# Patient Record
Sex: FEMALE | Race: BLACK OR AFRICAN AMERICAN | Employment: OTHER | URBAN - METROPOLITAN AREA
[De-identification: names, ages, dates, MRNs, and addresses within clinical notes are randomized per-mention and may not be internally consistent; named-entity substitution may affect disease eponyms.]

---

## 2020-01-15 ENCOUNTER — TRANSFERRED RECORDS (OUTPATIENT)
Dept: HEALTH INFORMATION MANAGEMENT | Facility: CLINIC | Age: 75
End: 2020-01-15

## 2020-02-03 ENCOUNTER — ALLIED HEALTH/NURSE VISIT (OUTPATIENT)
Dept: UROLOGY | Facility: CLINIC | Age: 75
End: 2020-02-03
Payer: COMMERCIAL

## 2020-02-03 ENCOUNTER — PRE VISIT (OUTPATIENT)
Dept: UROLOGY | Facility: CLINIC | Age: 75
End: 2020-02-03

## 2020-02-03 ENCOUNTER — OFFICE VISIT (OUTPATIENT)
Dept: UROLOGY | Facility: CLINIC | Age: 75
End: 2020-02-03
Payer: COMMERCIAL

## 2020-02-03 VITALS — HEIGHT: 64 IN | WEIGHT: 161 LBS | BODY MASS INDEX: 27.49 KG/M2

## 2020-02-03 DIAGNOSIS — Z72.0 TOBACCO USE: ICD-10-CM

## 2020-02-03 DIAGNOSIS — Z86.73 HISTORY OF STROKE: ICD-10-CM

## 2020-02-03 DIAGNOSIS — N28.89 RIGHT RENAL MASS: Primary | ICD-10-CM

## 2020-02-03 PROBLEM — Z74.09 POOR MOBILITY: Status: ACTIVE | Noted: 2019-11-02

## 2020-02-03 PROBLEM — R10.9 ABDOMINAL PAIN: Status: ACTIVE | Noted: 2020-01-16

## 2020-02-03 PROBLEM — L40.9 PSORIASIS: Status: ACTIVE | Noted: 2019-11-02

## 2020-02-03 PROBLEM — M17.9 DJD (DEGENERATIVE JOINT DISEASE) OF KNEE: Status: ACTIVE | Noted: 2020-02-03

## 2020-02-03 PROBLEM — K86.89 DILATION OF PANCREATIC DUCT: Status: ACTIVE | Noted: 2020-01-20

## 2020-02-03 PROBLEM — E78.00 HIGH CHOLESTEROL: Status: ACTIVE | Noted: 2019-11-02

## 2020-02-03 PROBLEM — I10 HYPERTENSION: Status: ACTIVE | Noted: 2019-11-02

## 2020-02-03 PROBLEM — E78.5 HYPERLIPIDEMIA: Status: ACTIVE | Noted: 2020-02-03

## 2020-02-03 PROBLEM — I63.9 CVA (CEREBRAL VASCULAR ACCIDENT) (H): Status: ACTIVE | Noted: 2020-02-03

## 2020-02-03 RX ORDER — CEFAZOLIN SODIUM 1 G/50ML
1 INJECTION, SOLUTION INTRAVENOUS SEE ADMIN INSTRUCTIONS
Status: CANCELLED | OUTPATIENT
Start: 2020-02-03

## 2020-02-03 RX ORDER — CEFAZOLIN SODIUM 2 G/50ML
2 SOLUTION INTRAVENOUS
Status: CANCELLED | OUTPATIENT
Start: 2020-02-03

## 2020-02-03 RX ORDER — ACETAMINOPHEN 500 MG
1000 TABLET ORAL EVERY 6 HOURS PRN
COMMUNITY
Start: 2019-12-11

## 2020-02-03 RX ORDER — FLUOCINOLONE ACETONIDE 0.11 MG/ML
OIL TOPICAL PRN
COMMUNITY
Start: 2019-07-17

## 2020-02-03 RX ORDER — BENZONATATE 100 MG/1
100 CAPSULE ORAL PRN
COMMUNITY
Start: 2019-09-08

## 2020-02-03 RX ORDER — AMOXICILLIN 250 MG
2 CAPSULE ORAL DAILY PRN
COMMUNITY
Start: 2020-01-20

## 2020-02-03 RX ORDER — KETOCONAZOLE 20 MG/G
CREAM TOPICAL DAILY PRN
COMMUNITY
Start: 2019-07-17

## 2020-02-03 RX ORDER — POTASSIUM CHLORIDE 750 MG/1
10 TABLET, EXTENDED RELEASE ORAL EVERY MORNING
COMMUNITY

## 2020-02-03 RX ORDER — AMLODIPINE BESYLATE 10 MG/1
10 TABLET ORAL EVERY MORNING
COMMUNITY
Start: 2019-07-08

## 2020-02-03 RX ORDER — ASCORBIC ACID 500 MG
500 TABLET ORAL EVERY MORNING
COMMUNITY

## 2020-02-03 RX ORDER — ASPIRIN AND DIPYRIDAMOLE 25; 200 MG/1; MG/1
1 CAPSULE, EXTENDED RELEASE ORAL DAILY PRN
COMMUNITY
Start: 2019-07-09

## 2020-02-03 RX ORDER — TRIAMCINOLONE ACETONIDE 1 MG/G
CREAM TOPICAL DAILY PRN
COMMUNITY

## 2020-02-03 RX ORDER — HYDROXYZINE HYDROCHLORIDE 10 MG/1
10 TABLET, FILM COATED ORAL EVERY MORNING
COMMUNITY
Start: 2019-05-13

## 2020-02-03 RX ORDER — CALCIPOTRIENE 0.05 MG/ML
1 SOLUTION TOPICAL DAILY PRN
COMMUNITY
Start: 2019-05-13

## 2020-02-03 RX ORDER — DIPHENHYD/PHENYLEPH/ACETAMINOP 12.5-5-325
TABLET ORAL
COMMUNITY
Start: 2019-09-04

## 2020-02-03 RX ORDER — DIPHENHYDRAMINE HCL 25 MG
25 CAPSULE ORAL DAILY PRN
COMMUNITY
Start: 2019-05-21

## 2020-02-03 RX ORDER — HYDROCHLOROTHIAZIDE 25 MG/1
25 TABLET ORAL EVERY MORNING
COMMUNITY
Start: 2019-11-21

## 2020-02-03 RX ORDER — MEMANTINE HYDROCHLORIDE 5 MG/1
5 TABLET ORAL EVERY MORNING
Status: ON HOLD | COMMUNITY
End: 2020-02-19

## 2020-02-03 RX ORDER — ATORVASTATIN CALCIUM 80 MG/1
80 TABLET, FILM COATED ORAL EVERY MORNING
COMMUNITY
Start: 2019-07-08

## 2020-02-03 RX ORDER — DIPHENHYDRAMINE HYDROCHLORIDE, ZINC ACETATE 2; .1 G/100G; G/100G
CREAM TOPICAL DAILY PRN
COMMUNITY
Start: 2019-07-08

## 2020-02-03 ASSESSMENT — MIFFLIN-ST. JEOR: SCORE: 1220.29

## 2020-02-03 ASSESSMENT — PAIN SCALES - GENERAL: PAINLEVEL: NO PAIN (0)

## 2020-02-03 NOTE — PROGRESS NOTES
Chief Complaint:   Right Renal Mass         Consult or Referral:     Mr. Zenobia Garcia is a 75 year old female seen at the request of Dr. Thomas.         History of Present Illness:    Zenobia Garcia is a very pleasant 75 year old female who presents with a history of right renal mass. This was noted on workup for abdominal pain. Mass is 9 cm and in right kidney.    ##RENAL MASS INITIAL ENCOUNTER##     Zenobia Garcia is a very pleasant 75 year old female who presents with a history of a Solid renal lesion/mass.  This was discovered due to adominal pain.  Initially diagnosed about 1 month(s) ago.  The lesion is solid and enhancing.  The maximal dimension of the renal lesion is 9 centimeters and located on the right side.      Concerning  her renal function, her last SCr is No results found for: CR.  she has the following risk factors for development of chronic kidney disease stroke, HTN.       she does have a history of previous abdominal or retroperitoneal surgery.  There is not a family history of renal cell cancer.  The patient has a history of smoking and currently is smoking.    The mass/lesion is located in the Right side and is primarily located in the inter polar region.  The tumor is also 50 % endophytic and exophytic.  The mass/lesion primarily lies on the lateral surface.  With regard to the collecting system, the edge of the tumor arrives either abuts the collecting system or arrives within 4 mm of the collecting system.    ECOG Performance Score: 2 - Ambulatory and independent in all ADLs; cannot work; up > 50% of the time         Past Medical History:   CVA - right-sided defects  Remote history of brain tumor  HLD  HTN  Psoriasis         Past Surgical History:   Cholecystectomy  Previous c-spine surgery         Medications     Current Outpatient Medications   Medication     acetaminophen (TYLENOL) 500 MG tablet     amLODIPine (NORVASC) 10 MG tablet     aspirin-dipyridamole ER (AGGRENOX)   MG 12 hr capsule     atorvastatin (LIPITOR) 80 MG tablet     benzonatate (TESSALON) 100 MG capsule     Blood Pressure Monitoring (BLOOD PRESSURE KIT) KIT     calcipotriene (DOVONOX) 0.005 % external solution     Calcium Carb-Cholecalciferol (OYSTER SHELL CALCIUM/VITAMIN D) 500-200 MG-UNIT TABS     carbamide peroxide (DEBROX) 6.5 % otic solution     Coal Tar Extract 5 % LOTN     diphenhydrAMINE (BANOPHEN) 25 MG capsule     diphenhydrAMINE-zinc acetate (BENADRYL) 2-0.1 % external cream     fluocinolone acetonide (DERMA SMOOTHE/FS BODY) 0.01 % external oil     hydrochlorothiazide (HYDRODIURIL) 25 MG tablet     hydrOXYzine (ATARAX) 10 MG tablet     ketoconazole (NIZORAL) 2 % external cream     memantine (NAMENDA) 5 MG tablet     potassium chloride ER (K-TAB/KLOR-CON) 10 MEQ CR tablet     Salicylic Acid 3 % CREA     senna-docusate (SENOKOT-S/PERICOLACE) 8.6-50 MG tablet     triamcinolone (KENALOG) 0.1 % external cream     vitamin C (ASCORBIC ACID) 500 MG tablet     vitamin D3 (D3-50) 1.25 MG (48997 UT) capsule     No current facility-administered medications for this visit.           Family History:   No known family history of  malignancy. Adopted         Social History:     Social History     Socioeconomic History     Marital status:      Spouse name: Not on file     Number of children: Not on file     Years of education: Not on file     Highest education level: Not on file   Occupational History     Not on file   Social Needs     Financial resource strain: Not on file     Food insecurity:     Worry: Not on file     Inability: Not on file     Transportation needs:     Medical: Not on file     Non-medical: Not on file   Tobacco Use     Smoking status: Current Every Day Smoker     Smokeless tobacco: Never Used   Substance and Sexual Activity     Alcohol use: Not on file     Drug use: Not on file     Sexual activity: Not on file   Lifestyle     Physical activity:     Days per week: Not on file     Minutes per  "session: Not on file     Stress: Not on file   Relationships     Social connections:     Talks on phone: Not on file     Gets together: Not on file     Attends Confucianist service: Not on file     Active member of club or organization: Not on file     Attends meetings of clubs or organizations: Not on file     Relationship status: Not on file     Intimate partner violence:     Fear of current or ex partner: Not on file     Emotionally abused: Not on file     Physically abused: Not on file     Forced sexual activity: Not on file   Other Topics Concern     Not on file   Social History Narrative     Not on file     Current every day smoker         Allergies:   Patient has no known allergies.         Review of Systems:  From intake questionnaire     Skin: negative  Eyes: negative  Ears/Nose/Throat: negative  Respiratory: No shortness of breath, dyspnea on exertion, cough, or hemoptysis  Cardiovascular: No chest pain or palpitations  Gastrointestinal: negative; no nausea/vomiting, constipation or diarrhea  Genitourinary: as per HPI  Musculoskeletal: negative  Neurologic: negative  Psychiatric: negative  Hematologic/Lymphatic/Immunologic: negative  Endocrine: negative         Physical Exam:     Patient is a 75 year old  female   Vitals: Height 1.626 m (5' 4\"), weight 73 kg (161 lb).  Constitutional: Body mass index is 27.64 kg/m .  Alert, no acute distress, oriented, conversant  Eyes: no scleral icterus; extraocular muscles intact, moist conjunctivae  Neck: trachea midline  Ears/nose/mouth: throat/mouth:normal, good dentition  Respiratory: no respiratory distress, or pursed lip breathing  Cardiovascular: pulses strong and intact; no obvious jugular venous distension present  Gastrointestinal: soft, nontender, no organomegaly or masses,   Lymphatics: No inguinal adenopathy  Musculoskeletal: extremities normal, no peripheral edema  Skin: no suspicious lesions or rashes  Neuro: Alert, oriented, speech and mentation normal; " mild dysarthria from stroke  Psych: affect and mood normal, alert and oriented to person, place and time  Gait: Right-sided deficits post-CVA; in wheelchair today      Imaging:    I directly visualized and reviewed all applicable imaging a with patient.    CT CAP 1/15/19:  Impression:   1. Large right renal mass measuring up to 9.0 cm which is highly   consistent with renal cell carcinoma until proven otherwise. Perirenal   soft tissue density, lymph node versus right adrenal nodule. Disease   involvement cannot be excluded.  2. Intrahepatic and extrahepatic biliary ductal dilation which is   nonspecific in the setting of prior cholecystectomy. However, there is   also mild dilation of the main pancreatic duct. Recommend correlation with   outside films for stability. If this is new, would recommend nonemergent   follow-up MRCP.  3. 3.2 cm heterogeneously dense splenic lesion. Recommend correlation with   outside films as this is indeterminate. Hemangioma is a consideration  4. Mild colonic diverticulosis without CT evidence for acute   Diverticulitis    CT Chest 1/18/20:  IMPRESSION:  1. Bilateral pulmonary nodules, for example a 4 mm part solid nodule in   the anterior right upper lobe and a 5 mm pleural-based nodule in the left   lower lobe. Additional nodules as above. Attention on follow-up.  2. Large right thyroid nodule that extends inferiorly through the thoracic   inlet displacing the esophagus to the left, consider further   characterization with ultrasound.  3. Old right rib fractures. No radiographic evidence of osseous metastatic   disease in the chest.  4. Splenic and right renal masses, better characterized on CT abdomen and   pelvis 1/15/2020.      Outside and Past Medical records:    I spent 10 minutes reviewing outside and past medical records.         Assessment and Plan:     Assessment: 75 year old female with 9 cm right renal mass. We had an extensive discussion about the significance of a  localized, enhancing kidney mass.  We discussed that approximately 80% of enhancing renal masses turn out to be kidney cancer upon removal, while 20% are found to be benign.  Although certain features such as size, gender and tumor characteristics can change these risks.    We discussed the role of renal mass biopsy including the fact that renal mass biopsy is very safe with current techniques (risk of tumor seeding far below 1%).  Though renal mass biopsy is usually quite accurate 90-95% of the time, it can be inaccurate and it can be non-diagnostic.  Furthermore, the majority of patients find they just need to proceed to surgery anyway.     We discussed the options for treatment of a localized kidney mass including active surveillance, thermal ablation, and surgical extirpation.  Surgical removal has the best track record and close to a 99% chance of cure for T1a lesions compared to 90% cure with thermal ablation and is generally preferred.  Furthermore, thermal ablation is not optimal for larger masses or centrally located masses.  Active surveillance is also a reasonable option when masses are small and/or life expectancy is less than 5-7 years.    Risks and benefits of laparoscopic radical nephrectomy were discussed in detail. Risks of surgery including bleeding, infection, MI, stroke, DVT/PE, bowel injury and injury to other surrounding structures were discussed. In addition, we discussed potential for positive surgical margins, vascular injury, and potential need for conversion to an open procedure.  All questions were answered.  A written informed consent will be finalized on the morning of the procedure. The anticipated post-operative course was explained.    Patient would like to proceed with nephrectomy. Will plan for MRI to evaluate for tumor thrombus and better assess the splenic lesion. Will also have her see PAC clinic.    Plan:  MRI abdomen  PAC clinic  Schedule for lap radical  nephrectomy    Orders  Orders Placed This Encounter   Procedures     MR Abdomen w/o & w Contrast     CBC with platelets     Basic metabolic panel     PAC Visit Referral (For [facility] Only)     Carlos Morales MD  Urology  [facility]

## 2020-02-03 NOTE — PATIENT INSTRUCTIONS
Please schedule imaging, today if possible.      It was a pleasure meeting with you today.  Thank you for allowing me and my team the privilege of caring for you today.  YOU are the reason we are here, and I truly hope we provided you with the excellent service you deserve.  Please let us know if there is anything else we can do for you so that we can be sure you are leaving completely satisfied with your care experience.

## 2020-02-03 NOTE — TELEPHONE ENCOUNTER
Reason for Visit: Consult    Diagnosis: Renal mass    Orders/Procedures/Records: Records available    Contact Patient: N/A    Rooming Requirements: Normal      Kathy Holt  02/03/20  6:14 AM

## 2020-02-03 NOTE — NURSING NOTE
"Chief Complaint   Patient presents with     Consult     Renal mass       Height 1.626 m (5' 4\"), weight 73 kg (161 lb). Body mass index is 27.64 kg/m .    Patient Active Problem List   Diagnosis     Abdominal pain     CVA (cerebral vascular accident) (H)     Dilation of pancreatic duct     DJD (degenerative joint disease) of knee     High cholesterol     Hyperlipidemia     History of stroke     Hypertension     Poor mobility     Psoriasis     Right renal mass     Tobacco use       No Known Allergies    Current Outpatient Medications   Medication Sig Dispense Refill     acetaminophen (TYLENOL) 500 MG tablet Take 1,000 mg by mouth       amLODIPine (NORVASC) 10 MG tablet Take 10 mg by mouth       aspirin-dipyridamole ER (AGGRENOX)  MG 12 hr capsule Take 1 capsule by mouth       atorvastatin (LIPITOR) 80 MG tablet Take 80 mg by mouth       benzonatate (TESSALON) 100 MG capsule TAKE 1 CAPSULE BY MOUTH THREE TIMES A DAY AS NEEDED FOR COUGH       Blood Pressure Monitoring (BLOOD PRESSURE KIT) KIT        calcipotriene (DOVONOX) 0.005 % external solution        Calcium Carb-Cholecalciferol (OYSTER SHELL CALCIUM/VITAMIN D) 500-200 MG-UNIT TABS Take 1 tablet by mouth       carbamide peroxide (DEBROX) 6.5 % otic solution Place 5 drops in ear(s)       Coal Tar Extract 5 % LOTN Apply 5 mLs topically       diphenhydrAMINE (BANOPHEN) 25 MG capsule TAKE 1 CAPSULE BY MOUTH EVERY 6 HOURS AS NEEDED FOR ITCHING.       diphenhydrAMINE-zinc acetate (BENADRYL) 2-0.1 % external cream        fluocinolone acetonide (DERMA SMOOTHE/FS BODY) 0.01 % external oil Apply to affected areas at bedtime.       hydrochlorothiazide (HYDRODIURIL) 25 MG tablet Take 25 mg by mouth daily       hydrOXYzine (ATARAX) 10 MG tablet Take 10 mg by mouth       ketoconazole (NIZORAL) 2 % external cream        memantine (NAMENDA) 5 MG tablet Take 5 mg by mouth       potassium chloride ER (K-TAB/KLOR-CON) 10 MEQ CR tablet Take 10 mEq by mouth       Salicylic Acid " 3 % CREA Apply 1 g topically       senna-docusate (SENOKOT-S/PERICOLACE) 8.6-50 MG tablet Take 2 tablets by mouth       triamcinolone (KENALOG) 0.1 % external cream        vitamin C (ASCORBIC ACID) 500 MG tablet Take 500 mg by mouth       vitamin D3 (D3-50) 1.25 MG (97040 UT) capsule TAKE ONE CAPSULE BY MOUTH ONE TIME PER WEEK         Social History     Tobacco Use     Smoking status: Current Every Day Smoker     Smokeless tobacco: Never Used   Substance Use Topics     Alcohol use: None     Drug use: None       Kathy Holt, EMT  2/3/2020  8:53 AM

## 2020-02-03 NOTE — TELEPHONE ENCOUNTER
MEDICAL RECORDS REQUEST   [facility] Clinic  [address] [place] [address]  PHONE: [phone]  Fax: [phone]        FUTURE VISIT INFORMATION                                                   Zenobia Jose : 1945 scheduled for future visit at [facility]    APPOINTMENT INFORMATION:    Date: 2/3/20 8:30AM    Provider:  Carlos Morales MD    Reason for Visit/Diagnosis: Renal mass    REFERRAL INFORMATION:    Referring provider:  Ulises Thomas     Specialty: MD    Referring providers clinic:  [facility]    Clinic contact number:  [phone]    RECORDS REQUESTED FOR VISIT                                                     NOTES  STATUS/DETAILS   OFFICE NOTE from referring provider  yes   OFFICE NOTE from other specialist  yes   DISCHARGE SUMMARY from hospital  yes   DISCHARGE REPORT from the ER  yes   OPERATIVE REPORT  no   MEDICATION LIST  yes   KIDNEY MASS     CT ABD PEVLIS  yes     PRE-VISIT CHECKLIST      Record collection complete Yes- [facility] recs in [facility]   Images in [facility] PACS    Appointment appropriately scheduled           (right time/right provider) Yes   MyChart activation If no, please explain: In process    Questionnaire complete If no, please explain: In process      Completed by: Damaris Pedroza

## 2020-02-03 NOTE — LETTER
2/3/2020     RE: Zenobia Garcia  [address]  [facility] [address]     Dear Colleague,    Thank you for referring your patient, Zenobia Garcia, to the [facility] UROLOGY AND INST FOR PROSTATE AND UROLOGIC CANCERS at [facility]. Please see a copy of my visit note below.          Chief Complaint:   Right Renal Mass         Consult or Referral:     Mr. Zenobia Garcia is a 75 year old female seen at the request of Dr. Thomas.         History of Present Illness:    Zenobia Garcia is a very pleasant 75 year old female who presents with a history of right renal mass. This was noted on workup for abdominal pain. Mass is 9 cm and in right kidney.    ##RENAL MASS INITIAL ENCOUNTER##     Zenobia Garcia is a very pleasant 75 year old female who presents with a history of a Solid renal lesion/mass.  This was discovered due to adominal pain.  Initially diagnosed about 1 month(s) ago.  The lesion is solid and enhancing.  The maximal dimension of the renal lesion is 9 centimeters and located on the right side.      Concerning  her renal function, her last SCr is No results found for: CR.  she has the following risk factors for development of chronic kidney disease stroke, HTN.       she does have a history of previous abdominal or retroperitoneal surgery.  There is not a family history of renal cell cancer.  The patient has a history of smoking and currently is smoking.    The mass/lesion is located in the Right side and is primarily located in the inter polar region.  The tumor is also 50 % endophytic and exophytic.  The mass/lesion primarily lies on the lateral surface.  With regard to the collecting system, the edge of the tumor arrives either abuts the collecting system or arrives within 4 mm of the collecting system.    ECOG Performance Score: 2 - Ambulatory and independent in all ADLs; cannot work; up > 50% of the time         Past Medical History:   CVA - right-sided defects  Remote  history of brain tumor  HLD  HTN  Psoriasis         Past Surgical History:   Cholecystectomy  Previous c-spine surgery         Medications     Current Outpatient Medications   Medication     acetaminophen (TYLENOL) 500 MG tablet     amLODIPine (NORVASC) 10 MG tablet     aspirin-dipyridamole ER (AGGRENOX)  MG 12 hr capsule     atorvastatin (LIPITOR) 80 MG tablet     benzonatate (TESSALON) 100 MG capsule     Blood Pressure Monitoring (BLOOD PRESSURE KIT) KIT     calcipotriene (DOVONOX) 0.005 % external solution     Calcium Carb-Cholecalciferol (OYSTER SHELL CALCIUM/VITAMIN D) 500-200 MG-UNIT TABS     carbamide peroxide (DEBROX) 6.5 % otic solution     Coal Tar Extract 5 % LOTN     diphenhydrAMINE (BANOPHEN) 25 MG capsule     diphenhydrAMINE-zinc acetate (BENADRYL) 2-0.1 % external cream     fluocinolone acetonide (DERMA SMOOTHE/FS BODY) 0.01 % external oil     hydrochlorothiazide (HYDRODIURIL) 25 MG tablet     hydrOXYzine (ATARAX) 10 MG tablet     ketoconazole (NIZORAL) 2 % external cream     memantine (NAMENDA) 5 MG tablet     potassium chloride ER (K-TAB/KLOR-CON) 10 MEQ CR tablet     Salicylic Acid 3 % CREA     senna-docusate (SENOKOT-S/PERICOLACE) 8.6-50 MG tablet     triamcinolone (KENALOG) 0.1 % external cream     vitamin C (ASCORBIC ACID) 500 MG tablet     vitamin D3 (D3-50) 1.25 MG (52650 UT) capsule     No current facility-administered medications for this visit.           Family History:   No known family history of  malignancy. Adopted         Social History:     Social History     Socioeconomic History     Marital status:      Spouse name: Not on file     Number of children: Not on file     Years of education: Not on file     Highest education level: Not on file   Occupational History     Not on file   Social Needs     Financial resource strain: Not on file     Food insecurity:     Worry: Not on file     Inability: Not on file     Transportation needs:     Medical: Not on file      "Non-medical: Not on file   Tobacco Use     Smoking status: Current Every Day Smoker     Smokeless tobacco: Never Used   Substance and Sexual Activity     Alcohol use: Not on file     Drug use: Not on file     Sexual activity: Not on file   Lifestyle     Physical activity:     Days per week: Not on file     Minutes per session: Not on file     Stress: Not on file   Relationships     Social connections:     Talks on phone: Not on file     Gets together: Not on file     Attends Confucianism service: Not on file     Active member of club or organization: Not on file     Attends meetings of clubs or organizations: Not on file     Relationship status: Not on file     Intimate partner violence:     Fear of current or ex partner: Not on file     Emotionally abused: Not on file     Physically abused: Not on file     Forced sexual activity: Not on file   Other Topics Concern     Not on file   Social History Narrative     Not on file     Current every day smoker         Allergies:   Patient has no known allergies.         Review of Systems:  From intake questionnaire     Skin: negative  Eyes: negative  Ears/Nose/Throat: negative  Respiratory: No shortness of breath, dyspnea on exertion, cough, or hemoptysis  Cardiovascular: No chest pain or palpitations  Gastrointestinal: negative; no nausea/vomiting, constipation or diarrhea  Genitourinary: as per HPI  Musculoskeletal: negative  Neurologic: negative  Psychiatric: negative  Hematologic/Lymphatic/Immunologic: negative  Endocrine: negative         Physical Exam:     Patient is a 75 year old  female   Vitals: Height 1.626 m (5' 4\"), weight 73 kg (161 lb).  Constitutional: Body mass index is 27.64 kg/m .  Alert, no acute distress, oriented, conversant  Eyes: no scleral icterus; extraocular muscles intact, moist conjunctivae  Neck: trachea midline  Ears/nose/mouth: throat/mouth:normal, good dentition  Respiratory: no respiratory distress, or pursed lip breathing  Cardiovascular: " pulses strong and intact; no obvious jugular venous distension present  Gastrointestinal: soft, nontender, no organomegaly or masses,   Lymphatics: No inguinal adenopathy  Musculoskeletal: extremities normal, no peripheral edema  Skin: no suspicious lesions or rashes  Neuro: Alert, oriented, speech and mentation normal; mild dysarthria from stroke  Psych: affect and mood normal, alert and oriented to person, place and time  Gait: Right-sided deficits post-CVA; in wheelchair today      Imaging:    I directly visualized and reviewed all applicable imaging a with patient.    CT CAP 1/15/19:  Impression:   1. Large right renal mass measuring up to 9.0 cm which is highly   consistent with renal cell carcinoma until proven otherwise. Perirenal   soft tissue density, lymph node versus right adrenal nodule. Disease   involvement cannot be excluded.  2. Intrahepatic and extrahepatic biliary ductal dilation which is   nonspecific in the setting of prior cholecystectomy. However, there is   also mild dilation of the main pancreatic duct. Recommend correlation with   outside films for stability. If this is new, would recommend nonemergent   follow-up MRCP.  3. 3.2 cm heterogeneously dense splenic lesion. Recommend correlation with   outside films as this is indeterminate. Hemangioma is a consideration  4. Mild colonic diverticulosis without CT evidence for acute   Diverticulitis    CT Chest 1/18/20:  IMPRESSION:  1. Bilateral pulmonary nodules, for example a 4 mm part solid nodule in   the anterior right upper lobe and a 5 mm pleural-based nodule in the left   lower lobe. Additional nodules as above. Attention on follow-up.  2. Large right thyroid nodule that extends inferiorly through the thoracic   inlet displacing the esophagus to the left, consider further   characterization with ultrasound.  3. Old right rib fractures. No radiographic evidence of osseous metastatic   disease in the chest.  4. Splenic and right renal  masses, better characterized on CT abdomen and   pelvis 1/15/2020.      Outside and Past Medical records:    I spent 10 minutes reviewing outside and past medical records.         Assessment and Plan:     Assessment: 75 year old female with 9 cm right renal mass. We had an extensive discussion about the significance of a localized, enhancing kidney mass.  We discussed that approximately 80% of enhancing renal masses turn out to be kidney cancer upon removal, while 20% are found to be benign.  Although certain features such as size, gender and tumor characteristics can change these risks.    We discussed the role of renal mass biopsy including the fact that renal mass biopsy is very safe with current techniques (risk of tumor seeding far below 1%).  Though renal mass biopsy is usually quite accurate 90-95% of the time, it can be inaccurate and it can be non-diagnostic.  Furthermore, the majority of patients find they just need to proceed to surgery anyway.     We discussed the options for treatment of a localized kidney mass including active surveillance, thermal ablation, and surgical extirpation.  Surgical removal has the best track record and close to a 99% chance of cure for T1a lesions compared to 90% cure with thermal ablation and is generally preferred.  Furthermore, thermal ablation is not optimal for larger masses or centrally located masses.  Active surveillance is also a reasonable option when masses are small and/or life expectancy is less than 5-7 years.    Risks and benefits of laparoscopic radical nephrectomy were discussed in detail. Risks of surgery including bleeding, infection, MI, stroke, DVT/PE, bowel injury and injury to other surrounding structures were discussed. In addition, we discussed potential for positive surgical margins, vascular injury, and potential need for conversion to an open procedure.  All questions were answered.  A written informed consent will be finalized on the morning of  the procedure. The anticipated post-operative course was explained.    Patient would like to proceed with nephrectomy. Will plan for MRI to evaluate for tumor thrombus and better assess the splenic lesion. Will also have her see PAC clinic.    Plan:  MRI abdomen  PAC clinic  Schedule for lap radical nephrectomy    Orders  Orders Placed This Encounter   Procedures     MR Abdomen w/o & w Contrast     CBC with platelets     Basic metabolic panel     PAC Visit Referral (For [facility] Only)     Carlos Morales MD  Urology  [facility]

## 2020-02-04 NOTE — TELEPHONE ENCOUNTER
FUTURE VISIT INFORMATION      SURGERY INFORMATION:    urology / surgery pending    Consult: OV 2/3/20 Carmen    RECORDS REQUESTED FROM:       Primary Care Provider: Mamie Denton MD  - [facility]    Pertinent Medical History: Hypertension    Most recent EKG+ Tracin/15/20- [facility]- requested tracing

## 2020-02-06 DIAGNOSIS — N39.0 URINARY TRACT INFECTION: Primary | ICD-10-CM

## 2020-02-06 NOTE — NURSING NOTE
Pre Op Teaching Flowsheet       Pre and Post op Patient Education  Relevant Diagnosis:  Right renal mass  Surgical procedure:  Right nephrectomy, ROBOT-ASSISTED, LAPAROSCOPIC, POSSIBLE OPEN  Teaching Topic:  Pre and post op teaching  Person Involved in teaching: Zenobia Garcia    Motivation Level:  Asks Questions: Yes  Eager to Learn:  Yes  Cooperative: Yes  Receptive (willing/able to accept information):  Yes    Patient demonstrates understanding of the following:  Date of surgery:  2/19/2020  Location of surgery:  [facility] [place] [facility]  History and Physical and any other testing necessary prior to surgery: Yes  Required time line for completion of History and Physical and any pre-op testing: Yes    Patient demonstrates understanding of the following:  Pre-op bowel prep:  N/A  Pre-op showering/scrub information with PCMX Soap: Yes  Blood thinner medications discussed and when to stop (if applicable):  Yes      Infection Prevention:   Patient demonstrates understanding of the following:  Surgical procedure site care taught: Yes  Signs and symptoms of infection taught:  Yes      Post-op follow-up:  Discussed how to contact the hospital, nurse, and clinic scheduling staff if necessary.    Instructional materials used/given/mailed:  [facility] Booklet, post op teaching sheet, Map, Soap, and arrival/location information.    Surgical instructions packet given to patient in office:  Yes.  MRI and PAC to be done 2/7/2020  Patient also advised to decrease or remove salt from her diet. Recommend she see dietitian to help her with finding alternative to making her foods taste better without adding extra salts.  Directions given to the patient for MRI since it will be at the [facility] and the Pac is here at the [facility]  Her sister is here assisting patient.

## 2020-02-07 ENCOUNTER — PRE VISIT (OUTPATIENT)
Dept: SURGERY | Facility: CLINIC | Age: 75
End: 2020-02-07

## 2020-02-07 ENCOUNTER — ANESTHESIA EVENT (OUTPATIENT)
Dept: SURGERY | Facility: CLINIC | Age: 75
DRG: 657 | End: 2020-02-07
Payer: COMMERCIAL

## 2020-02-07 ENCOUNTER — OFFICE VISIT (OUTPATIENT)
Dept: SURGERY | Facility: CLINIC | Age: 75
End: 2020-02-07
Payer: COMMERCIAL

## 2020-02-07 ENCOUNTER — HOSPITAL ENCOUNTER (OUTPATIENT)
Dept: MRI IMAGING | Facility: CLINIC | Age: 75
Discharge: HOME | End: 2020-02-07
Attending: UROLOGY | Admitting: UROLOGY
Payer: COMMERCIAL

## 2020-02-07 VITALS
OXYGEN SATURATION: 100 % | RESPIRATION RATE: 16 BRPM | WEIGHT: 139 LBS | DIASTOLIC BLOOD PRESSURE: 68 MMHG | HEIGHT: 63 IN | BODY MASS INDEX: 24.63 KG/M2 | SYSTOLIC BLOOD PRESSURE: 142 MMHG | HEART RATE: 75 BPM | TEMPERATURE: 98.9 F

## 2020-02-07 DIAGNOSIS — N28.89 RIGHT RENAL MASS: ICD-10-CM

## 2020-02-07 DIAGNOSIS — F17.200 TOBACCO USE DISORDER: ICD-10-CM

## 2020-02-07 DIAGNOSIS — Z01.818 PRE-OP EVALUATION: Primary | ICD-10-CM

## 2020-02-07 DIAGNOSIS — R07.9 INTERMITTENT CHEST PAIN: ICD-10-CM

## 2020-02-07 DIAGNOSIS — N39.0 URINARY TRACT INFECTION: ICD-10-CM

## 2020-02-07 DIAGNOSIS — I15.9 SECONDARY HYPERTENSION: ICD-10-CM

## 2020-02-07 DIAGNOSIS — E78.5 DYSLIPIDEMIA: ICD-10-CM

## 2020-02-07 LAB
ALBUMIN UR-MCNC: >499 MG/DL
ANION GAP SERPL CALCULATED.3IONS-SCNC: 5 MMOL/L (ref 3–14)
APPEARANCE UR: ABNORMAL
BILIRUB UR QL STRIP: NEGATIVE
BUN SERPL-MCNC: 18 MG/DL (ref 7–30)
CALCIUM SERPL-MCNC: 9.5 MG/DL (ref 8.5–10.1)
CHLORIDE SERPL-SCNC: 105 MMOL/L (ref 94–109)
CO2 SERPL-SCNC: 31 MMOL/L (ref 20–32)
COLOR UR AUTO: YELLOW
CREAT SERPL-MCNC: 0.98 MG/DL (ref 0.52–1.04)
ERYTHROCYTE [DISTWIDTH] IN BLOOD BY AUTOMATED COUNT: 15.4 % (ref 10–15)
GFR SERPL CREATININE-BSD FRML MDRD: 57 ML/MIN/{1.73_M2}
GLUCOSE SERPL-MCNC: 160 MG/DL (ref 70–99)
GLUCOSE UR STRIP-MCNC: NEGATIVE MG/DL
HCT VFR BLD AUTO: 38.8 % (ref 35–47)
HGB BLD-MCNC: 12.3 G/DL (ref 11.7–15.7)
HGB UR QL STRIP: NEGATIVE
KETONES UR STRIP-MCNC: NEGATIVE MG/DL
LEUKOCYTE ESTERASE UR QL STRIP: NEGATIVE
MCH RBC QN AUTO: 26.6 PG (ref 26.5–33)
MCHC RBC AUTO-ENTMCNC: 31.7 G/DL (ref 31.5–36.5)
MCV RBC AUTO: 84 FL (ref 78–100)
MUCOUS THREADS #/AREA URNS LPF: PRESENT /LPF
NITRATE UR QL: NEGATIVE
PH UR STRIP: 6 PH (ref 5–7)
PLATELET # BLD AUTO: 337 10E9/L (ref 150–450)
POTASSIUM SERPL-SCNC: 3.5 MMOL/L (ref 3.4–5.3)
RBC # BLD AUTO: 4.62 10E12/L (ref 3.8–5.2)
RBC #/AREA URNS AUTO: 1 /HPF (ref 0–2)
SODIUM SERPL-SCNC: 141 MMOL/L (ref 133–144)
SOURCE: ABNORMAL
SP GR UR STRIP: 1.02 (ref 1–1.03)
SQUAMOUS #/AREA URNS AUTO: 13 /HPF (ref 0–1)
UROBILINOGEN UR STRIP-MCNC: 0 MG/DL (ref 0–2)
WBC # BLD AUTO: 5.1 10E9/L (ref 4–11)
WBC #/AREA URNS AUTO: 2 /HPF (ref 0–5)

## 2020-02-07 PROCEDURE — A9585 GADOBUTROL INJECTION: HCPCS | Performed by: UROLOGY

## 2020-02-07 PROCEDURE — 25500064 ZZH RX 255 OP 636: Performed by: UROLOGY

## 2020-02-07 PROCEDURE — 74183 MRI ABD W/O CNTR FLWD CNTR: CPT

## 2020-02-07 RX ORDER — GADOBUTROL 604.72 MG/ML
7.5 INJECTION INTRAVENOUS ONCE
Status: COMPLETED | OUTPATIENT
Start: 2020-02-07 | End: 2020-02-07

## 2020-02-07 RX ADMIN — GADOBUTROL 7.3 ML: 604.72 INJECTION INTRAVENOUS at 10:54

## 2020-02-07 ASSESSMENT — LIFESTYLE VARIABLES: TOBACCO_USE: 1

## 2020-02-07 ASSESSMENT — MIFFLIN-ST. JEOR: SCORE: 1104.63

## 2020-02-07 ASSESSMENT — PAIN SCALES - GENERAL: PAINLEVEL: NO PAIN (0)

## 2020-02-07 NOTE — ANESTHESIA PREPROCEDURE EVALUATION
Anesthesia Pre-Procedure Evaluation    Patient: Zenobia Garcia   MRN:     [id number] Gender:   female   Age:    75 year old :      1945        Preoperative Diagnosis: Right renal mass [N28.89]   Procedure(s):  NEPHRECTOMY, ROBOT-ASSISTED, LAPAROSCOPIC, POSSIBLE OPEN     Past Medical History:   Diagnosis Date     Current tobacco use      Dyslipidemia      H/O: CVA (cerebrovascular accident)      Hypertension       No past surgical history on file.     No Known Allergies  Social History     Tobacco Use     Smoking status: Current Every Day Smoker     Packs/day: 0.50     Smokeless tobacco: Never Used   Substance Use Topics     Alcohol use: Yes     Comment: once in a while     Prior to Admission medications    Medication Sig Start Date End Date Taking? Authorizing Provider   acetaminophen (TYLENOL) 500 MG tablet Take 1,000 mg by mouth every 6 hours as needed  19  Yes Reported, Patient   amLODIPine (NORVASC) 10 MG tablet Take 10 mg by mouth every morning  19  Yes Reported, Patient   aspirin-dipyridamole ER (AGGRENOX)  MG 12 hr capsule Take 1 capsule by mouth daily as needed  19  Yes Reported, Patient   atorvastatin (LIPITOR) 80 MG tablet Take 80 mg by mouth every morning  19  Yes Reported, Patient   benzonatate (TESSALON) 100 MG capsule Take 100 mg by mouth as needed  19  Yes Reported, Patient   calcipotriene (DOVONOX) 0.005 % external solution Apply 1 applicator topically daily as needed  19  Yes Reported, Patient   Calcium Carb-Cholecalciferol (OYSTER SHELL CALCIUM/VITAMIN D) 500-200 MG-UNIT TABS Take 1 tablet by mouth every morning  19  Yes Reported, Patient   Coal Tar Extract 5 % LOTN Apply 5 mLs topically daily as needed  19  Yes Reported, Patient   diphenhydrAMINE (BANOPHEN) 25 MG capsule Take 25 mg by mouth daily as needed  19  Yes Reported, Patient   diphenhydrAMINE-zinc acetate (BENADRYL) 2-0.1 % external cream Apply topically daily as needed  19  Yes  Reported, Patient   fluocinolone acetonide (DERMA SMOOTHE/FS BODY) 0.01 % external oil as needed  7/17/19  Yes Reported, Patient   hydrochlorothiazide (HYDRODIURIL) 25 MG tablet Take 25 mg by mouth every morning  11/21/19  Yes Reported, Patient   hydrOXYzine (ATARAX) 10 MG tablet Take 10 mg by mouth every morning  5/13/19  Yes Reported, Patient   ketoconazole (NIZORAL) 2 % external cream Apply topically daily as needed  7/17/19  Yes Reported, Patient   memantine XR 28 MG PO 24 hr capsule Take 28 mg by mouth daily   Yes Reported, Patient   potassium chloride ER (K-TAB/KLOR-CON) 10 MEQ CR tablet Take 10 mEq by mouth every morning    Yes Reported, Patient   Salicylic Acid 3 % CREA Apply 1 g topically daily as needed  5/13/19  Yes Reported, Patient   senna-docusate (SENOKOT-S/PERICOLACE) 8.6-50 MG tablet Take 2 tablets by mouth daily as needed for constipation  1/20/20  Yes Reported, Patient   triamcinolone (KENALOG) 0.1 % external cream Apply topically daily as needed    Yes Reported, Patient   vitamin C (ASCORBIC ACID) 500 MG tablet Take 500 mg by mouth every morning    Yes Reported, Patient   vitamin D2 05448 units (1250 mcg) PO capsule Take 50,000 Units by mouth once a week   Yes Reported, Patient   Blood Pressure Monitoring (BLOOD PRESSURE KIT) KIT  9/4/19   Reported, Patient     Current Facility-Administered Medications Ordered in Epic   Medication Dose Route Frequency Last Rate Last Dose     ceFAZolin (ANCEF) 1 g vial to attach to  ml bag for ADULT or 50 ml bag for PEDS  1 g Intravenous See Admin Instructions         ceFAZolin (ANCEF) intermittent infusion 2 g in 100 mL dextrose PRE-MIX  2 g Intravenous Pre-Op/Pre-procedure x 1 dose         lactated ringers infusion   Intravenous Continuous         lidocaine 1 % 0.1-1 mL  0.1-1 mL Other Q1H PRN         No current [facility]-ordered outpatient medications on file.       lactated ringers       Recent Labs   Lab Test 02/19/20  0621 02/07/20  1453   NA  --  141    POTASSIUM 3.3* 3.5   CHLORIDE  --  105   CO2  --  31   ANIONGAP  --  5   GLC  --  160*   BUN  --  18   CR  --  0.98   FARA  --  9.5     Recent Labs   Lab Test 02/07/20  1453   WBC 5.1   HGB 12.3        No results for input(s): ABO, RH in the last 44250 hours.  No results for input(s): TROPI in the last 71559 hours.  No results for input(s): PH, PCO2, PO2, HCO3 in the last 11752 hours.  No results for input(s): HCG in the last 58877 hours.  Recent Results (from the past 744 hour(s))   MR Abdomen w/o & w Contrast    Narrative    EXAMINATION: MR ABDOMEN W/O & W CONTRAST, 2/7/2020 11:30 AM    COMPARISON: CT abdomen pelvis 1/15/2020    HISTORY: right renal mass; splenic lesion. eval for IVC thrombus and  characterize splenic lesion; Right renal mass    TECHNIQUE:     Images were acquired with and without gadolinium contrast through the  abdomen. Multiplanar T2, axial T1 in/out of phase, and diffusion  weighted images were obtained without contrast. Multiplanar  T1-weighted images with fat saturation were acquired at the multiple  time points following the administration of intravenous contrast.  Contrast dose: 7.3ml gadavist    FINDINGS:    Right kidney: Several subcentimeter simple cyst in the left kidney.  Hydronephrosis or hydroureter. No suspicious enhancing lesions.    Left kidney:   There is a mass within the superior segment pole the right kidney  which measures approximately 9.0 x 7.3 x 8.6 cm. No masses  heterogeneously hyperintense on T2 images, and demonstrates mild  heterogeneous enhancement, with central necrosis. There is no clear  invasion of the right renal vein. The mass demonstrates a large degree  of diffusion restriction.    There is a mass arising from the superior pole of the right kidney  which measures approximately 9.0 x 7.3 x 8.6 cm. The mass demonstrates  heterogeneous enhancement and central necrosis. The right renal vein  is patent.    Remainder of abdomen:   Images are degraded by  motion artifact. There is a 2.6 x 3.1 cm round,  T2 heterogeneously hyperintense lesion in the spleen, which  demonstrates heterogeneous enhancement on the portal venous phase  postcontrast images, and fairly homogeneous enhancement on the delayed  images. Of note, the enhancement characteristics of the splenic lesion  are different from the mass occupying the right kidney. There appears  to be subtle diffusion restriction with the central aspect of the  splenic lesion, although less intense compared to the right renal  mass.    No focal liver lesions. No evidence of steatosis and iron deposition.  The gallbladder is surgically absent. There is intrahepatic and extra  hepatic biliary dilatation with prominence of the common bile duct  measuring up to 11 mm. Main pancreatic duct is also prominent measures  4 mm the pancreatic body. No definite obstructing stone or other  lesion is identified. Duodenal diverticulum is seen posterior to the  pancreatic head. The major abdominal vasculature is patent. There are  no abnormally dilated loops of bowel. There are no enlarged lymph  nodes in the upper abdomen.    No suspicious osseous lesions.      Impression    IMPRESSION:   1. There is a 9.0 x 7.3 x 8.6 cm mass in the right kidney, which  demonstrates heterogeneous enhancement and large degree of diffusion  restriction. These findings are suspicious for renal cell carcinoma,  favor chromophobe variant. There is no definite invasion of the right  renal vein.   2. 3.1 cm round enhancing lesion in the spleen, which is favored to  represent a hemangioma.   3. Intrahepatic and extrahepatic biliary dilatation, which could be  related to reservoir effect following cholecystectomy. Mildly  prominent main pancreatic duct measuring up to 4 mm. No obstructing  stone or other lesion is identified.     I have personally reviewed the examination and initial interpretation  and I agree with the findings.    YONATAN YUEN MD   Echo  Stress Echocardiogram    [facility]    [id number]  [id number]  [id number]  [id number]^GLEN^MYA^JHON           [facility],[place]  Echocardiography Laboratory  [address] [place] [id number]     Name: FELICE BAIN  MRN: [id number]  : 1945  Study Date: 02/10/2020 03:22 PM  Age: 75 yrs  Gender: Female  Patient Location: [facility]  Reason For Study: Pre-op evaluation, Tobacco use disorder, Secondary  hypertension,  Ordering Physician: YMA CARROLL  Referring Physician: MYA CARROLL  Performed By: Lucia Esquivel RDCS, KATIE     BSA: 1.7 m2  Height: 63 in  Weight: 139 lb  HR: 69  BP: 143/25 mmHg  _____________________________________________________________________________  __     _____________________________________________________________________________  __        Interpretation Summary  Normal Dobutamine stress echocardiogram at target heart rate.  No resting or stress induced regional wall motion abnormalities.  No symptoms during the test.  Normal baseline and stress ECGs.  Normal BP and heart rate response to Dobutamine.  Normal baseline screening echocardiogram.  No significant valvular abnormalities.  _____________________________________________________________________________  __     Stress  The drug infusion was stopped due to target heart rate achieved.  The patient did not exhibit any symptoms during drug infusion.  The maximum dose of dobutamine was 30mcg/kg/min.  The maximum dose of metoprolol was 2mg.  Definity (NDC #55816-856-89) given intravenously.  Patient was given 6ml mixture of 1.5ml Definity and 8.5ml saline.  4 ml wasted.  The EKG portion of this stress test was negative for inducible ischemia (see  echo results below).  This was a normal stress echocardiogram with no evidence of stress-induced  ischemia.  Normal left ventricular function and wall motion at rest and post-stress.  The visual ejection fraction is estimated at >70%.     Baseline  Normal  baseline electrocardiogram.  Normal left ventricular function and wall motion at rest.  The visual ejection fraction is estimated at 55-60%.     Stress Results                                       Maximum Predicted HR:   147 bpm             Target HR: 125 bpm        % Maximum Predicted HR: 88 %                             Stage  DurationHeart Rate  BP                                 (mm:ss)   (bpm)                         Baseline            69    143/25                           Peak    8:30     130    153/66                            Stress Duration:   8:30 mm:ss *                      Maximum Stress HR: 130 bpm *     Left Ventricle  Normal Dobutamine stress echocardiogram at target heart rate.  No resting or stress induced regional wall motion abnormalities.  No symptoms during the test.  Normal baseline and stress ECGs.  Normal BP and heart rate response to Dobutamine.  Normal baseline screening echocardiogram.  No significant valvular abnormalities.     Vessels  The aortic root is normal size.     Procedure  Dobutamine stress echo with two dimensional color and spectral Doppler  performed. Contrast Definity.     _____________________________________________________________________________  __  MMode/2D Measurements & Calculations  asc Aorta Diam: 3.0 cm           Doppler Measurements & Calculations  TR max dion: 218.4 cm/sec  TR max P.1 mmHg           _____________________________________________________________________________  __           Report approved by: Philip Dawson 02/10/2020 04:44 PM        Recent Results (from the past 4320 hour(s))   Echo Stress Echocardiogram    Narrative    [id number]  [id number]  [id number]  [id number]^GLEN^MYA^JHON           [facility],[place]  Echocardiography Laboratory  [address] [place] [id number]     Name: FELICE BAIN  MRN: [id number]  : 1945  Study Date: 02/10/2020 03:22 PM  Age: 75 yrs  Gender: Female  Patient  Location: [facility]  Reason For Study: Pre-op evaluation, Tobacco use disorder, Secondary  hypertension,  Ordering Physician: MYA CARROLL  Referring Physician: MYA CARROLL  Performed By: Lucia Esquivel [facility]     BSA: 1.7 m2  Height: 63 in  Weight: 139 lb  HR: 69  BP: 143/25 mmHg  _____________________________________________________________________________  __     _____________________________________________________________________________  __        Interpretation Summary  Normal Dobutamine stress echocardiogram at target heart rate.  No resting or stress induced regional wall motion abnormalities.  No symptoms during the test.  Normal baseline and stress ECGs.  Normal BP and heart rate response to Dobutamine.  Normal baseline screening echocardiogram.  No significant valvular abnormalities.  _____________________________________________________________________________  __     Stress  The drug infusion was stopped due to target heart rate achieved.  The patient did not exhibit any symptoms during drug infusion.  The maximum dose of dobutamine was 30mcg/kg/min.  The maximum dose of metoprolol was 2mg.  Definity (NDC #15264-704-06) given intravenously.  Patient was given 6ml mixture of 1.5ml Definity and 8.5ml saline.  4 ml wasted.  The EKG portion of this stress test was negative for inducible ischemia (see  echo results below).  This was a normal stress echocardiogram with no evidence of stress-induced  ischemia.  Normal left ventricular function and wall motion at rest and post-stress.  The visual ejection fraction is estimated at >70%.     Baseline  Normal baseline electrocardiogram.  Normal left ventricular function and wall motion at rest.  The visual ejection fraction is estimated at 55-60%.     Stress Results                                       Maximum Predicted HR:   147 bpm             Target HR: 125 bpm        % Maximum Predicted HR: 88 %                             Stage  DurationHeart Rate   BP                                 (mm:ss)   (bpm)                         Baseline            69    143/25                           Peak    8:30     130    153/66                            Stress Duration:   8:30 mm:ss *                      Maximum Stress HR: 130 bpm *     Left Ventricle  Normal Dobutamine stress echocardiogram at target heart rate.  No resting or stress induced regional wall motion abnormalities.  No symptoms during the test.  Normal baseline and stress ECGs.  Normal BP and heart rate response to Dobutamine.  Normal baseline screening echocardiogram.  No significant valvular abnormalities.     Vessels  The aortic root is normal size.     Procedure  Dobutamine stress echo with two dimensional color and spectral Doppler  performed. Contrast Definity.     _____________________________________________________________________________  __  MMode/2D Measurements & Calculations  asc Aorta Diam: 3.0 cm           Doppler Measurements & Calculations  TR max dion: 218.4 cm/sec  TR max P.1 mmHg           _____________________________________________________________________________  __           Report approved by: Philip Dawson 02/10/2020 04:44 PM            RECENT LABS:       Anesthesia Evaluation     . Pt has had prior anesthetic.     History of anesthetic complications   - PONV        ROS/MED HX    ENT/Pulmonary:     (+)tobacco use, Current use , . .    Neurologic:     (+)CVA date:  with deficits- right side weakness,     Cardiovascular:     (+) Dyslipidemia, hypertension----. Taking blood thinners : . . . :. . Previous cardiac testing date:results:Stress Testdate: results:ECG reviewed date:1/15/20 results:SR with marked sinus arrhythmia date: results:          METS/Exercise Tolerance:  1 - Eating, dressing   Hematologic:  - neg hematologic  ROS      (-) History of Transfusion   Musculoskeletal:   (+)  other musculoskeletal- right side [place] secondary to CVA      GI/Hepatic:     (+) GERD  "Symptomatic,       Renal/Genitourinary:         Endo:  - neg endo ROS       Psychiatric:  - neg psychiatric ROS       Infectious Disease:  - neg infectious disease ROS       Malignancy:   (+)   Current evaluation for right renal mass        Other:                         PHYSICAL EXAM:   Mental Status/Neuro: A/A/O   Airway: Facies: Feasible  Mallampati: III  Mouth/Opening: Full  TM distance: > 6 cm  Neck ROM: Limited   Respiratory: Auscultation: CTAB     Resp. Rate: Normal     Resp. Effort: Normal      CV: Rhythm: Regular  Heart: Normal Sounds  Edema: None  Pulses: Normal   Comments:      Dental: Dentures  Dentures: Upper; Lower; Complete                LABS:  CBC: No results found for: WBC, HGB, HCT, PLT  BMP: No results found for: NA, POTASSIUM, CHLORIDE, CO2, BUN, CR, GLC  COAGS: No results found for: PTT, INR, FIBR  POC: No results found for: BGM, HCG, HCGS  OTHER: No results found for: PH, LACT, A1C, FARA, PHOS, MAG, ALBUMIN, PROTTOTAL, ALT, AST, GGT, ALKPHOS, BILITOTAL, BILIDIRECT, LIPASE, AMYLASE, ROMMEL, TSH, T4, T3, CRP, SED     Preop Vitals    BP Readings from Last 3 Encounters:   No data found for BP    Pulse Readings from Last 3 Encounters:   No data found for Pulse      Resp Readings from Last 3 Encounters:   No data found for Resp    SpO2 Readings from Last 3 Encounters:   No data found for SpO2      Temp Readings from Last 1 Encounters:   No data found for Temp    Ht Readings from Last 1 Encounters:   02/03/20 1.626 m (5' 4\")      Wt Readings from Last 1 Encounters:   02/03/20 73 kg (161 lb)    Estimated body mass index is 27.64 kg/m  as calculated from the following:    Height as of 2/3/20: 1.626 m (5' 4\").    Weight as of 2/3/20: 73 kg (161 lb).     LDA:        Assessment:   ASA SCORE: 3    H&P: History and physical reviewed and following examination; no interval change.   Smoking Status:  Non-Smoker/Unknown   NPO Status: NPO Appropriate     Plan:   Anes. Type:  General      Induction:  IV (Standard) "   Airway: ETT; Oral   Access/Monitoring: PIV   Maintenance: Balanced     Postop Plan:   Postop Pain: Opioids  Postop Sedation/Airway: Not planned  Disposition: Inpatient/Admit     PONV Management:   Adult Risk Factors: Female, Non-Smoker, Postop Opioids   Prevention: Ondansetron, Dexamethasone     CONSENT:   Plan and risks discussed with: Patient          Comments for Plan/Consent:  Background propofol gtt  hydromorphone              PAC Discussion and Assessment    ASA Classification: 3  Case is suitable for:   Anesthetic techniques and relevant risks discussed:   Invasive monitoring and risk discussed:   Types:   Possibility and Risk of blood transfusion discussed:   NPO instructions given:   Additional anesthetic preparation and risks discussed:   Needs early admission to pre-op area:   Other:     PAC Resident/NP Anesthesia Assessment:  Zenobia Garcia is a 73 year old female scheduled for NEPHRECTOMY, ROBOT-ASSISTED, LAPAROSCOPIC, POSSIBLE OPEN on 2/19/20 by Dr. Morales in treatment of right renal mass.  PAC referral for risk assessment and optimization for anesthesia with comorbid conditions of hypertension, dyslipidemia, current tobacco use, h/o CVA:    Pre-operative considerations:  1.  Cardiac:  Functional status- METS 1. H/o hypertension and dyslipidemia. Endorses intermittent chest pain with emotionally stressed, most recently about one week ago.  Denies any CP today. EKG 1/15/20 showed SR with sinus arrhythmia.  Dobutamine stress test ordered- will help her to schedule.   intermediate risk surgery with 6.6% (RCRI #) risk of major adverse cardiac event.   2.  Pulm:  Airway feasible.  ARA risk: low.  Encouraged smoking cessation and educated not to smoke on the day of the procedure.   3.  GI:  History of PONV, anti-emetic intervention recommended.  4.  Neuro: h/o CVA 7 years ago with residual right sided weakness. Currently using Aggrenox 25/200 mg daily- will hold 7 days prior to procedure.   H/o dementia on  "namenda- continue    VTE risk: 0.5% if mass is not malignant, 3% if it is    Patient is optimized and is acceptable candidate for the proposed procedure, pending Stress Test results.    Patient discussed with Dr. Lugo    Addendum 2/10/20:  Stress test completed with following results  \"Interpretation Summary  Normal Dobutamine stress echocardiogram at target heart rate.  No resting or stress induced regional wall motion abnormalities.  No symptoms during the test.  Normal baseline and stress ECGs.  Normal BP and heart rate response to Dobutamine.  Normal baseline screening echocardiogram.  No significant valvular abnormalities.\"    Patient is optimized and is acceptable candidate for the proposed procedure    **For further details of assessment, testing, and physical exam please see H and P completed on same date.      Joanna Sidhu PA-C        Mid-Level Provider/Resident:   Date:   Time:     Attending Anesthesiologist Anesthesia Assessment:  I have reviewed the medical record and discussed the patient with the HENRI.  The patient is scheduled for robotic assisted laparoscopic nephrectomy for renal mass  The patient has several co morbidities:    CV :  Multiple risk factors (HTN, HLD, CVA, Tobacco use) and complains of occasional CP with stress that has not been investigated  Pulmonary:  Ongoing tobacco use  CNS:  Remote brain tumor, CVA 7 years ago    Surgery is time sensitive but unexplained chest pain with multiple risk factors necessitates investigation (alternative therapies are available for renal mass therapy per surgeon note)  Will order dobutamine stress echocardiogram.  If positive for ischemia will need cardiology consult and discussion with surgery on treatment plan.      Reviewed and Signed by PAC Anesthesiologist  Anesthesiologist: Arpit Lugo MD  Date: 2/7/2020  Time:   Pass/Fail:   Disposition:     PAC Pharmacist Assessment:        Pharmacist:   Date:   Time:    Joanna Sidhu PA-C  "

## 2020-02-07 NOTE — H&P
Pre-Operative H & P     CC:  Preoperative exam to assess for increased cardiopulmonary risk while undergoing surgery and anesthesia.    Date of Encounter: 2/7/2020  Primary Care Physician:  No Ref-Primary, Physician  associated diagnosis: right renal mass    HPI  Zenobia Garcia is a 75 year old female who presents for pre-operative H & P in preparation for laparoscopic nephrectomy with Dr. Morales on 2/19/20 at [facility]. Patient is being evaluated for comorbid conditions of hypertension, dyslipidemia, current tobacco use, h/o CVA    Ms. Garcia was found to have a right renal mass during workup for abdominal pain about 1 month ago. She was referred to Dr. Morales for further evaluation.  Above procedure planned.     History is obtained from the patient and chart review.      Past Medical History  Past Medical History:   Diagnosis Date     Current tobacco use      Dyslipidemia      H/O: CVA (cerebrovascular accident)      Hypertension        Past Surgical History  Past Surgical History:   Procedure Laterality Date     APPENDECTOMY       CRANIECTOMY         Hx of Blood transfusions/reactions: denies     Hx of abnormal bleeding or anti-platelet use: daily Aggrenox    Menstrual history: No LMP recorded. Patient has had a hysterectomy.:     Steroid use in the last year: denies    Personal or FH with difficulty with Anesthesia:  Patient has a history of PONV, but has no family history of anesthesia complications.      Prior to Admission Medications  Current Outpatient Medications   Medication Sig Dispense Refill     acetaminophen (TYLENOL) 500 MG tablet Take 1,000 mg by mouth as needed        amLODIPine (NORVASC) 10 MG tablet Take 10 mg by mouth every morning        aspirin-dipyridamole ER (AGGRENOX)  MG 12 hr capsule Take 1 capsule by mouth as needed        atorvastatin (LIPITOR) 80 MG tablet Take 80 mg by mouth every morning        benzonatate (TESSALON) 100 MG capsule Take 100 mg by mouth as needed         Blood Pressure Monitoring (BLOOD PRESSURE KIT) KIT        calcipotriene (DOVONOX) 0.005 % external solution Apply 1 applicator topically as needed        Calcium Carb-Cholecalciferol (OYSTER SHELL CALCIUM/VITAMIN D) 500-200 MG-UNIT TABS Take 1 tablet by mouth every morning        Coal Tar Extract 5 % LOTN Apply 5 mLs topically as needed        diphenhydrAMINE (BANOPHEN) 25 MG capsule Take 25 mg by mouth as needed        diphenhydrAMINE-zinc acetate (BENADRYL) 2-0.1 % external cream Apply topically as needed        fluocinolone acetonide (DERMA SMOOTHE/FS BODY) 0.01 % external oil as needed        hydrochlorothiazide (HYDRODIURIL) 25 MG tablet Take 25 mg by mouth every morning        hydrOXYzine (ATARAX) 10 MG tablet Take 10 mg by mouth every morning        ketoconazole (NIZORAL) 2 % external cream Apply topically as needed        memantine (NAMENDA) 5 MG tablet Take 5 mg by mouth every morning        potassium chloride ER (K-TAB/KLOR-CON) 10 MEQ CR tablet Take 10 mEq by mouth every morning        Salicylic Acid 3 % CREA Apply 1 g topically as needed        senna-docusate (SENOKOT-S/PERICOLACE) 8.6-50 MG tablet Take 2 tablets by mouth as needed        triamcinolone (KENALOG) 0.1 % external cream Apply topically as needed        vitamin C (ASCORBIC ACID) 500 MG tablet Take 500 mg by mouth every morning        vitamin D3 (D3-50) 1.25 MG (73809 UT) capsule Take 50,000 Units by mouth every morning          Allergies  No Known Allergies    Social History  Social History     Socioeconomic History     Marital status:      Spouse name: Not on file     Number of children: Not on file     Years of education: Not on file     Highest education level: Not on file   Occupational History     Not on file   Social Needs     Financial resource strain: Not on file     Food insecurity:     Worry: Not on file     Inability: Not on file     Transportation needs:     Medical: Not on file     Non-medical: Not on file   Tobacco  Use     Smoking status: Current Every Day Smoker     Packs/day: 0.50     Smokeless tobacco: Never Used   Substance and Sexual Activity     Alcohol use: Not Currently     Drug use: Yes     Types: Marijuana     Sexual activity: Not on file   Lifestyle     Physical activity:     Days per week: Not on file     Minutes per session: Not on file     Stress: Not on file   Relationships     Social connections:     Talks on phone: Not on file     Gets together: Not on file     Attends [employer] service: Not on file     Active member of club or organization: Not on file     Attends meetings of clubs or organizations: Not on file     Relationship status: Not on file     Intimate partner violence:     Fear of current or ex partner: Not on file     Emotionally abused: Not on file     Physically abused: Not on file     Forced sexual activity: Not on file   Other Topics Concern     Not on file   Social History Narrative     Not on file       Family History  Family History   Adopted: Yes   Family history unknown: Yes       ROS/MED HX    ENT/Pulmonary:     (+)tobacco use, Current use , . .    Neurologic:     (+)CVA date: 2013 with deficits- right side weakness,     Cardiovascular:     (+) Dyslipidemia, hypertension----. Taking blood thinners : . . . :. . Previous cardiac testing date:results:date: results:ECG reviewed date:1/15/20 results:SR with marked sinus arrhythmia date: results:          METS/Exercise Tolerance:  1 - Eating, dressing   Hematologic:  - neg hematologic  ROS      (-) History of Transfusion   Musculoskeletal:   (+)  other musculoskeletal- right side [place] secondary to CVA      GI/Hepatic:     (+) GERD Symptomatic,       Renal/Genitourinary:         Endo:  - neg endo ROS       Psychiatric:  - neg psychiatric ROS       Infectious Disease:  - neg infectious disease ROS       Malignancy:   (+)   Current evaluation for right renal mass        Other:           The complete review of systems is negative other than  "noted in the HPI or here.   Temp: 98.9  F (37.2  C) Temp src: Oral BP: (!) 142/68 Pulse: 75   Resp: 16 SpO2: 100 %         139 lbs 0 oz  5' 3\"   Body mass index is 24.62 kg/m .       Physical Exam  Constitutional: Awake, alert, cooperative, no apparent distress, and appears stated age. Presents with her sister  Eyes: Pupils equal, round and reactive to light, extra ocular muscles intact, sclera clear, conjunctiva normal.  HENT: Normocephalic, oral pharynx with moist mucus membranes, edentulous with Dentures present. No goiter appreciated.   Respiratory: CTAB  Cardiovascular: Regular rate and rhythm, normal S1 and S2, and no murmur noted.  Carotids +2, no bruits. No edema. Palpable pulses to radial arteries.   GI: Normal bowel sounds  Lymph/Hematologic: No cervical lymphadenopathy and no supraclavicular lymphadenopathy.  Genitourinary:  deferred  Skin: Warm and dry.   Musculoskeletal: Limited ROM of neck. There is no redness, warmth, or swelling of the exposed joints. Gross motor strength is normal.    Neurologic: Awake, alert, oriented to name, place and time. Cranial nerves II-XII are grossly intact.   Neuropsychiatric: Calm, cooperative. Normal affect.     Labs: (personally reviewed)  Component      Latest Ref Rng & Units 2/7/2020   Sodium      133 - 144 mmol/L 141   Potassium      3.4 - 5.3 mmol/L 3.5   Chloride      94 - 109 mmol/L 105   Carbon Dioxide      20 - 32 mmol/L 31   Anion Gap      3 - 14 mmol/L 5   Glucose      70 - 99 mg/dL 160 (H)   Urea Nitrogen      7 - 30 mg/dL 18   Creatinine      0.52 - 1.04 mg/dL 0.98   GFR Estimate      >60 mL/min/1.73:m2 57 (L)   GFR Estimate If Black      >60 mL/min/1.73:m2 66   Calcium      8.5 - 10.1 mg/dL 9.5   WBC      4.0 - 11.0 10e9/L 5.1   RBC Count      3.8 - 5.2 10e12/L 4.62   Hemoglobin      11.7 - 15.7 g/dL 12.3   Hematocrit      35.0 - 47.0 % 38.8   MCV      78 - 100 fl 84   MCH      26.5 - 33.0 pg 26.6   MCHC      31.5 - 36.5 g/dL 31.7   RDW      10.0 - 15.0 % " 15.4 (H)   Platelet Count      150 - 450 10e9/L 337       EKG 1/15/20  Sinus rhythm with marked sinus arrhythmia    CT CAP 1/15/19  Impression:   1. Large right renal mass measuring up to 9.0 cm which is highly   consistent with renal cell carcinoma until proven otherwise. Perirenal   soft tissue density, lymph node versus right adrenal nodule. Disease   involvement cannot be excluded.  2. Intrahepatic and extrahepatic biliary ductal dilation which is   nonspecific in the setting of prior cholecystectomy. However, there is   also mild dilation of the main pancreatic duct. Recommend correlation with   outside films for stability. If this is new, would recommend nonemergent   follow-up MRCP.  3. 3.2 cm heterogeneously dense splenic lesion. Recommend correlation with   outside films as this is indeterminate. Hemangioma is a consideration  4. Mild colonic diverticulosis without CT evidence for acute   Diverticulitis    CT Chest 1/18/20  IMPRESSION:  1. Bilateral pulmonary nodules, for example a 4 mm part solid nodule in   the anterior right upper lobe and a 5 mm pleural-based nodule in the left   lower lobe. Additional nodules as above. Attention on follow-up.  2. Large right thyroid nodule that extends inferiorly through the thoracic   inlet displacing the esophagus to the left, consider further   characterization with ultrasound.  3. Old right rib fractures. No radiographic evidence of osseous metastatic   disease in the chest.  4. Splenic and right renal masses, better characterized on CT abdomen and   pelvis 1/15/2020.    Outside records reviewed from: Care everywhere    ASSESSMENT and PLAN  Zenobia Garcia is a 73 year old female scheduled for NEPHRECTOMY, ROBOT-ASSISTED, LAPAROSCOPIC, POSSIBLE OPEN on 2/19/20 by Dr. Morales in treatment of right renal mass.  PAC referral for risk assessment and optimization for anesthesia with comorbid conditions of hypertension, dyslipidemia, current tobacco use, h/o  "CVA:    Pre-operative considerations:  1.  Cardiac:  Functional status- METS 1. H/o hypertension and dyslipidemia. Endorses intermittent chest pain with emotionally stressed, most recently about one week ago.  Denies any CP today. EKG 1/15/20 showed SR with sinus arrhythmia.  Dobutamine stress test ordered- will help her to schedule.   intermediate risk surgery with 6.6% (RCRI #) risk of major adverse cardiac event.   2.  Pulm:  Airway feasible.  ARA risk: low.  Encouraged smoking cessation and educated not to smoke on the day of the procedure.   3.  GI:  History of PONV, anti-emetic intervention recommended.  4.  Neuro: h/o CVA 7 years ago with residual right sided weakness. Currently using Aggrenox 25/200 mg daily- will hold 7 days prior to procedure.   H/o dementia on namenda- continue    VTE risk: 0.5% if mass is not malignant, 3% if it is    Patient is optimized and is acceptable candidate for the proposed procedure, pending Stress Test results.    Patient discussed with Dr. Lugo    Addendum 2/10/20:  Stress test completed with following results  \"Interpretation Summary  Normal Dobutamine stress echocardiogram at target heart rate.  No resting or stress induced regional wall motion abnormalities.  No symptoms during the test.  Normal baseline and stress ECGs.  Normal BP and heart rate response to Dobutamine.  Normal baseline screening echocardiogram.  No significant valvular abnormalities.\"    Patient is optimized and is acceptable candidate for the proposed procedure      Joanna Sidhu PA-C  Preoperative Assessment Center  [facility]  Clinic and [facility] Center  Phone: [phone]  Fax: [phone]  "

## 2020-02-07 NOTE — PATIENT INSTRUCTIONS
I have confirmed patient has instructions for NPO and location of surgery from [facility].  Patient will take Lipitor, amlodipine, namenda morning of surgery and will hold Aggrenox for 7 days prior to procedure.  Please hold ibuprofen and multivitamins and supplements for 7 days prior to procedure.     We will help you schedule a dobutamine stress ECHO prior to your proceudre

## 2020-02-08 LAB
BACTERIA SPEC CULT: NORMAL
Lab: NORMAL
SPECIMEN SOURCE: NORMAL

## 2020-02-10 ENCOUNTER — HOSPITAL ENCOUNTER (OUTPATIENT)
Dept: CARDIOLOGY | Facility: CLINIC | Age: 75
Discharge: HOME | End: 2020-02-10
Attending: PHYSICIAN ASSISTANT | Admitting: PHYSICIAN ASSISTANT
Payer: COMMERCIAL

## 2020-02-10 PROCEDURE — 93016 CV STRESS TEST SUPVJ ONLY: CPT | Performed by: INTERNAL MEDICINE

## 2020-02-10 PROCEDURE — 93325 DOPPLER ECHO COLOR FLOW MAPG: CPT | Mod: 26 | Performed by: INTERNAL MEDICINE

## 2020-02-10 PROCEDURE — 93321 DOPPLER ECHO F-UP/LMTD STD: CPT | Mod: 26 | Performed by: INTERNAL MEDICINE

## 2020-02-10 PROCEDURE — 93350 STRESS TTE ONLY: CPT | Mod: 26 | Performed by: INTERNAL MEDICINE

## 2020-02-10 PROCEDURE — 25000125 ZZHC RX 250: Performed by: INTERNAL MEDICINE

## 2020-02-10 PROCEDURE — 25000128 H RX IP 250 OP 636: Performed by: INTERNAL MEDICINE

## 2020-02-10 PROCEDURE — 25500064 ZZH RX 255 OP 636: Performed by: INTERNAL MEDICINE

## 2020-02-10 PROCEDURE — 93325 DOPPLER ECHO COLOR FLOW MAPG: CPT | Mod: TC

## 2020-02-10 PROCEDURE — 93018 CV STRESS TEST I&R ONLY: CPT | Performed by: INTERNAL MEDICINE

## 2020-02-10 RX ORDER — ATROPINE SULFATE 0.4 MG/ML
.2-2 AMPUL (ML) INJECTION
Status: DISCONTINUED | OUTPATIENT
Start: 2020-02-10 | End: 2020-02-10 | Stop reason: CLARIF

## 2020-02-10 RX ORDER — DOBUTAMINE HYDROCHLORIDE 200 MG/100ML
10-50 INJECTION INTRAVENOUS CONTINUOUS
Status: ACTIVE | OUTPATIENT
Start: 2020-02-10 | End: 2020-02-10

## 2020-02-10 RX ORDER — SODIUM CHLORIDE 9 MG/ML
INJECTION, SOLUTION INTRAVENOUS CONTINUOUS
Status: ACTIVE | OUTPATIENT
Start: 2020-02-10 | End: 2020-02-10

## 2020-02-10 RX ORDER — METOPROLOL TARTRATE 1 MG/ML
1-20 INJECTION, SOLUTION INTRAVENOUS
Status: ACTIVE | OUTPATIENT
Start: 2020-02-10 | End: 2020-02-10

## 2020-02-10 RX ADMIN — PERFLUTREN 6 ML: 6.52 INJECTION, SUSPENSION INTRAVENOUS at 15:47

## 2020-02-10 RX ADMIN — DOBUTAMINE HYDROCHLORIDE 10 MCG/KG/MIN: 200 INJECTION INTRAVENOUS at 15:33

## 2020-02-10 RX ADMIN — METOPROLOL TARTRATE 2 MG: 5 INJECTION INTRAVENOUS at 15:43

## 2020-02-19 ENCOUNTER — TRANSFERRED RECORDS (OUTPATIENT)
Dept: HEALTH INFORMATION MANAGEMENT | Facility: CLINIC | Age: 75
End: 2020-02-19

## 2020-02-19 ENCOUNTER — ANESTHESIA (OUTPATIENT)
Dept: SURGERY | Facility: CLINIC | Age: 75
DRG: 657 | End: 2020-02-19
Payer: COMMERCIAL

## 2020-02-19 ENCOUNTER — HOSPITAL ENCOUNTER (INPATIENT)
Facility: CLINIC | Age: 75
LOS: 1 days | Discharge: HOME | DRG: 657 | End: 2020-02-20
Attending: UROLOGY | Admitting: UROLOGY
Payer: COMMERCIAL

## 2020-02-19 DIAGNOSIS — N28.89 RIGHT RENAL MASS: Primary | ICD-10-CM

## 2020-02-19 LAB
ABO + RH BLD: NORMAL
ABO + RH BLD: NORMAL
ANION GAP SERPL CALCULATED.3IONS-SCNC: 8 MMOL/L (ref 3–14)
BLD GP AB SCN SERPL QL: NORMAL
BLOOD BANK CMNT PATIENT-IMP: NORMAL
BUN SERPL-MCNC: 18 MG/DL (ref 7–30)
CALCIUM SERPL-MCNC: 8.6 MG/DL (ref 8.5–10.1)
CHLORIDE SERPL-SCNC: 104 MMOL/L (ref 94–109)
CO2 SERPL-SCNC: 27 MMOL/L (ref 20–32)
CREAT SERPL-MCNC: 1.37 MG/DL (ref 0.52–1.04)
GFR SERPL CREATININE-BSD FRML MDRD: 38 ML/MIN/{1.73_M2}
GLUCOSE SERPL-MCNC: 124 MG/DL (ref 70–99)
HGB BLD-MCNC: 11.3 G/DL (ref 11.7–15.7)
POTASSIUM SERPL-SCNC: 3.3 MMOL/L (ref 3.4–5.3)
POTASSIUM SERPL-SCNC: 3.9 MMOL/L (ref 3.4–5.3)
SODIUM SERPL-SCNC: 139 MMOL/L (ref 133–144)
SPECIMEN EXP DATE BLD: NORMAL

## 2020-02-19 PROCEDURE — 71000012 ZZH RECOVERY PHASE 1 LEVEL 1 FIRST HR: Performed by: UROLOGY

## 2020-02-19 PROCEDURE — 71000013 ZZH RECOVERY PHASE 1 LEVEL 1 EA ADDTL HR: Performed by: UROLOGY

## 2020-02-19 PROCEDURE — 88341 IMHCHEM/IMCYTCHM EA ADD ANTB: CPT | Performed by: UROLOGY

## 2020-02-19 PROCEDURE — 36000085 ZZH SURGERY LEVEL 8 1ST 30 MIN: Performed by: UROLOGY

## 2020-02-19 PROCEDURE — 88307 TISSUE EXAM BY PATHOLOGIST: CPT | Mod: 26 | Performed by: UROLOGY

## 2020-02-19 PROCEDURE — 25000128 H RX IP 250 OP 636: Performed by: UROLOGY

## 2020-02-19 PROCEDURE — 86850 RBC ANTIBODY SCREEN: CPT | Performed by: UROLOGY

## 2020-02-19 PROCEDURE — 25000566 ZZH SEVOFLURANE, EA 15 MIN: Performed by: UROLOGY

## 2020-02-19 PROCEDURE — 25000132 ZZH RX MED GY IP 250 OP 250 PS 637: Performed by: STUDENT IN AN ORGANIZED HEALTH CARE EDUCATION/TRAINING PROGRAM

## 2020-02-19 PROCEDURE — 84132 ASSAY OF SERUM POTASSIUM: CPT | Performed by: ANESTHESIOLOGY

## 2020-02-19 PROCEDURE — 36000087 ZZH SURGERY LEVEL 8 EA 15 ADDTL MIN: Performed by: UROLOGY

## 2020-02-19 PROCEDURE — 8E0W4CZ ROBOTIC ASSISTED PROCEDURE OF TRUNK REGION, PERCUTANEOUS ENDOSCOPIC APPROACH: ICD-10-PCS | Performed by: UROLOGY

## 2020-02-19 PROCEDURE — 27210794 ZZH OR GENERAL SUPPLY STERILE: Performed by: UROLOGY

## 2020-02-19 PROCEDURE — 88307 TISSUE EXAM BY PATHOLOGIST: CPT | Performed by: UROLOGY

## 2020-02-19 PROCEDURE — 37000008 ZZH ANESTHESIA TECHNICAL FEE, 1ST 30 MIN: Performed by: UROLOGY

## 2020-02-19 PROCEDURE — 50545 LAPARO RADICAL NEPHRECTOMY: CPT | Mod: 22 | Performed by: UROLOGY

## 2020-02-19 PROCEDURE — 36415 COLL VENOUS BLD VENIPUNCTURE: CPT | Performed by: ANESTHESIOLOGY

## 2020-02-19 PROCEDURE — 0DNW4ZZ RELEASE PERITONEUM, PERCUTANEOUS ENDOSCOPIC APPROACH: ICD-10-PCS | Performed by: UROLOGY

## 2020-02-19 PROCEDURE — 40000169 ZZH STATISTIC PRE-PROCEDURE ASSESSMENT I: Performed by: UROLOGY

## 2020-02-19 PROCEDURE — 80048 BASIC METABOLIC PNL TOTAL CA: CPT | Performed by: UROLOGY

## 2020-02-19 PROCEDURE — 00000159 ZZHCL STATISTIC H-SEND OUTS PREP: Performed by: UROLOGY

## 2020-02-19 PROCEDURE — 86900 BLOOD TYPING SEROLOGIC ABO: CPT | Performed by: UROLOGY

## 2020-02-19 PROCEDURE — 86901 BLOOD TYPING SEROLOGIC RH(D): CPT | Performed by: UROLOGY

## 2020-02-19 PROCEDURE — 25000125 ZZHC RX 250: Performed by: ANESTHESIOLOGY

## 2020-02-19 PROCEDURE — 25000125 ZZHC RX 250: Performed by: NURSE ANESTHETIST, CERTIFIED REGISTERED

## 2020-02-19 PROCEDURE — 37000009 ZZH ANESTHESIA TECHNICAL FEE, EACH ADDTL 15 MIN: Performed by: UROLOGY

## 2020-02-19 PROCEDURE — 25800030 ZZH RX IP 258 OP 636: Performed by: ANESTHESIOLOGY

## 2020-02-19 PROCEDURE — 85018 HEMOGLOBIN: CPT | Performed by: UROLOGY

## 2020-02-19 PROCEDURE — 12000000 ZZH R&B MED SURG/OB

## 2020-02-19 PROCEDURE — 36415 COLL VENOUS BLD VENIPUNCTURE: CPT | Performed by: UROLOGY

## 2020-02-19 PROCEDURE — 88342 IMHCHEM/IMCYTCHM 1ST ANTB: CPT | Performed by: UROLOGY

## 2020-02-19 PROCEDURE — 88341 IMHCHEM/IMCYTCHM EA ADD ANTB: CPT | Mod: 26 | Performed by: UROLOGY

## 2020-02-19 PROCEDURE — S2900 ROBOTIC SURGICAL SYSTEM: HCPCS | Performed by: UROLOGY

## 2020-02-19 PROCEDURE — 25000128 H RX IP 250 OP 636: Performed by: NURSE ANESTHETIST, CERTIFIED REGISTERED

## 2020-02-19 PROCEDURE — 88342 IMHCHEM/IMCYTCHM 1ST ANTB: CPT | Mod: 26 | Performed by: UROLOGY

## 2020-02-19 PROCEDURE — 0TT04ZZ RESECTION OF RIGHT KIDNEY, PERCUTANEOUS ENDOSCOPIC APPROACH: ICD-10-PCS | Performed by: UROLOGY

## 2020-02-19 PROCEDURE — 25800030 ZZH RX IP 258 OP 636: Performed by: NURSE ANESTHETIST, CERTIFIED REGISTERED

## 2020-02-19 RX ORDER — ONDANSETRON 2 MG/ML
4 INJECTION INTRAMUSCULAR; INTRAVENOUS EVERY 6 HOURS PRN
Status: DISCONTINUED | OUTPATIENT
Start: 2020-02-19 | End: 2020-02-20 | Stop reason: HOSPADM

## 2020-02-19 RX ORDER — HYDRALAZINE HYDROCHLORIDE 20 MG/ML
2.5-5 INJECTION INTRAMUSCULAR; INTRAVENOUS EVERY 10 MIN PRN
Status: DISCONTINUED | OUTPATIENT
Start: 2020-02-19 | End: 2020-02-19 | Stop reason: HOSPADM

## 2020-02-19 RX ORDER — ONDANSETRON 4 MG/1
4 TABLET, ORALLY DISINTEGRATING ORAL EVERY 6 HOURS PRN
Status: DISCONTINUED | OUTPATIENT
Start: 2020-02-19 | End: 2020-02-20 | Stop reason: HOSPADM

## 2020-02-19 RX ORDER — SODIUM CHLORIDE, SODIUM LACTATE, POTASSIUM CHLORIDE, CALCIUM CHLORIDE 600; 310; 30; 20 MG/100ML; MG/100ML; MG/100ML; MG/100ML
INJECTION, SOLUTION INTRAVENOUS CONTINUOUS
Status: DISCONTINUED | OUTPATIENT
Start: 2020-02-19 | End: 2020-02-19 | Stop reason: HOSPADM

## 2020-02-19 RX ORDER — SODIUM CHLORIDE 9 MG/ML
INJECTION, SOLUTION INTRAVENOUS CONTINUOUS
Status: DISCONTINUED | OUTPATIENT
Start: 2020-02-19 | End: 2020-02-20

## 2020-02-19 RX ORDER — LIDOCAINE HYDROCHLORIDE 20 MG/ML
INJECTION, SOLUTION INFILTRATION; PERINEURAL PRN
Status: DISCONTINUED | OUTPATIENT
Start: 2020-02-19 | End: 2020-02-19

## 2020-02-19 RX ORDER — AMOXICILLIN 250 MG
2 CAPSULE ORAL 2 TIMES DAILY
Status: DISCONTINUED | OUTPATIENT
Start: 2020-02-19 | End: 2020-02-20 | Stop reason: HOSPADM

## 2020-02-19 RX ORDER — POTASSIUM CHLORIDE 750 MG/1
10 TABLET, EXTENDED RELEASE ORAL EVERY MORNING
Status: DISCONTINUED | OUTPATIENT
Start: 2020-02-20 | End: 2020-02-20 | Stop reason: HOSPADM

## 2020-02-19 RX ORDER — GLYCOPYRROLATE 0.2 MG/ML
INJECTION, SOLUTION INTRAMUSCULAR; INTRAVENOUS PRN
Status: DISCONTINUED | OUTPATIENT
Start: 2020-02-19 | End: 2020-02-19

## 2020-02-19 RX ORDER — BUPIVACAINE HYDROCHLORIDE 2.5 MG/ML
INJECTION, SOLUTION EPIDURAL; INFILTRATION; INTRACAUDAL PRN
Status: DISCONTINUED | OUTPATIENT
Start: 2020-02-19 | End: 2020-02-19 | Stop reason: HOSPADM

## 2020-02-19 RX ORDER — CALCIPOTRIENE 0.05 MG/ML
1 SOLUTION TOPICAL DAILY PRN
Status: DISCONTINUED | OUTPATIENT
Start: 2020-02-19 | End: 2020-02-20 | Stop reason: HOSPADM

## 2020-02-19 RX ORDER — ERGOCALCIFEROL 1.25 MG/1
50000 CAPSULE, LIQUID FILLED ORAL WEEKLY
COMMUNITY

## 2020-02-19 RX ORDER — HYDROCHLOROTHIAZIDE 25 MG/1
25 TABLET ORAL EVERY MORNING
Status: DISCONTINUED | OUTPATIENT
Start: 2020-02-20 | End: 2020-02-20 | Stop reason: HOSPADM

## 2020-02-19 RX ORDER — FENTANYL CITRATE 50 UG/ML
25-50 INJECTION, SOLUTION INTRAMUSCULAR; INTRAVENOUS
Status: DISCONTINUED | OUTPATIENT
Start: 2020-02-19 | End: 2020-02-19 | Stop reason: HOSPADM

## 2020-02-19 RX ORDER — HYDROMORPHONE HYDROCHLORIDE 1 MG/ML
0.2 INJECTION, SOLUTION INTRAMUSCULAR; INTRAVENOUS; SUBCUTANEOUS
Status: DISCONTINUED | OUTPATIENT
Start: 2020-02-19 | End: 2020-02-20 | Stop reason: HOSPADM

## 2020-02-19 RX ORDER — PROPOFOL 10 MG/ML
INJECTION, EMULSION INTRAVENOUS PRN
Status: DISCONTINUED | OUTPATIENT
Start: 2020-02-19 | End: 2020-02-19

## 2020-02-19 RX ORDER — MEMANTINE HYDROCHLORIDE 28 MG/1
28 CAPSULE, EXTENDED RELEASE ORAL DAILY
Status: DISCONTINUED | OUTPATIENT
Start: 2020-02-19 | End: 2020-02-20 | Stop reason: HOSPADM

## 2020-02-19 RX ORDER — CEFAZOLIN SODIUM 1 G/3ML
1 INJECTION, POWDER, FOR SOLUTION INTRAMUSCULAR; INTRAVENOUS SEE ADMIN INSTRUCTIONS
Status: DISCONTINUED | OUTPATIENT
Start: 2020-02-19 | End: 2020-02-19 | Stop reason: HOSPADM

## 2020-02-19 RX ORDER — LABETALOL HYDROCHLORIDE 5 MG/ML
10 INJECTION, SOLUTION INTRAVENOUS
Status: DISCONTINUED | OUTPATIENT
Start: 2020-02-19 | End: 2020-02-19 | Stop reason: HOSPADM

## 2020-02-19 RX ORDER — ACETAMINOPHEN 325 MG/1
650 TABLET ORAL EVERY 4 HOURS
Status: DISCONTINUED | OUTPATIENT
Start: 2020-02-19 | End: 2020-02-20 | Stop reason: HOSPADM

## 2020-02-19 RX ORDER — LIDOCAINE 40 MG/G
CREAM TOPICAL
Status: DISCONTINUED | OUTPATIENT
Start: 2020-02-19 | End: 2020-02-20 | Stop reason: HOSPADM

## 2020-02-19 RX ORDER — CEFAZOLIN SODIUM 2 G/100ML
2 INJECTION, SOLUTION INTRAVENOUS
Status: COMPLETED | OUTPATIENT
Start: 2020-02-19 | End: 2020-02-19

## 2020-02-19 RX ORDER — HYDROMORPHONE HYDROCHLORIDE 1 MG/ML
.3-.5 INJECTION, SOLUTION INTRAMUSCULAR; INTRAVENOUS; SUBCUTANEOUS EVERY 5 MIN PRN
Status: DISCONTINUED | OUTPATIENT
Start: 2020-02-19 | End: 2020-02-19 | Stop reason: HOSPADM

## 2020-02-19 RX ORDER — DEXAMETHASONE SODIUM PHOSPHATE 4 MG/ML
INJECTION, SOLUTION INTRA-ARTICULAR; INTRALESIONAL; INTRAMUSCULAR; INTRAVENOUS; SOFT TISSUE PRN
Status: DISCONTINUED | OUTPATIENT
Start: 2020-02-19 | End: 2020-02-19

## 2020-02-19 RX ORDER — PROPOFOL 10 MG/ML
INJECTION, EMULSION INTRAVENOUS CONTINUOUS PRN
Status: DISCONTINUED | OUTPATIENT
Start: 2020-02-19 | End: 2020-02-19

## 2020-02-19 RX ORDER — ONDANSETRON 2 MG/ML
INJECTION INTRAMUSCULAR; INTRAVENOUS PRN
Status: DISCONTINUED | OUTPATIENT
Start: 2020-02-19 | End: 2020-02-19

## 2020-02-19 RX ORDER — OXYCODONE HYDROCHLORIDE 5 MG/1
5-10 TABLET ORAL EVERY 4 HOURS PRN
Status: DISCONTINUED | OUTPATIENT
Start: 2020-02-19 | End: 2020-02-20 | Stop reason: HOSPADM

## 2020-02-19 RX ORDER — NALOXONE HYDROCHLORIDE 0.4 MG/ML
.1-.4 INJECTION, SOLUTION INTRAMUSCULAR; INTRAVENOUS; SUBCUTANEOUS
Status: DISCONTINUED | OUTPATIENT
Start: 2020-02-19 | End: 2020-02-20 | Stop reason: HOSPADM

## 2020-02-19 RX ORDER — ONDANSETRON 4 MG/1
4 TABLET, ORALLY DISINTEGRATING ORAL EVERY 30 MIN PRN
Status: DISCONTINUED | OUTPATIENT
Start: 2020-02-19 | End: 2020-02-19 | Stop reason: HOSPADM

## 2020-02-19 RX ORDER — ATORVASTATIN CALCIUM 40 MG/1
80 TABLET, FILM COATED ORAL EVERY MORNING
Status: DISCONTINUED | OUTPATIENT
Start: 2020-02-20 | End: 2020-02-20 | Stop reason: HOSPADM

## 2020-02-19 RX ORDER — HYDROXYZINE HYDROCHLORIDE 10 MG/1
10 TABLET, FILM COATED ORAL EVERY MORNING
Status: DISCONTINUED | OUTPATIENT
Start: 2020-02-20 | End: 2020-02-20 | Stop reason: HOSPADM

## 2020-02-19 RX ORDER — NALOXONE HYDROCHLORIDE 0.4 MG/ML
.1-.4 INJECTION, SOLUTION INTRAMUSCULAR; INTRAVENOUS; SUBCUTANEOUS
Status: DISCONTINUED | OUTPATIENT
Start: 2020-02-19 | End: 2020-02-19

## 2020-02-19 RX ORDER — FLUOCINOLONE ACETONIDE 0.11 MG/ML
OIL TOPICAL DAILY PRN
Status: DISCONTINUED | OUTPATIENT
Start: 2020-02-19 | End: 2020-02-20 | Stop reason: HOSPADM

## 2020-02-19 RX ORDER — FENTANYL CITRATE 50 UG/ML
INJECTION, SOLUTION INTRAMUSCULAR; INTRAVENOUS PRN
Status: DISCONTINUED | OUTPATIENT
Start: 2020-02-19 | End: 2020-02-19

## 2020-02-19 RX ORDER — DIPHENHYDRAMINE HYDROCHLORIDE, ZINC ACETATE 2; .1 G/100G; G/100G
CREAM TOPICAL DAILY PRN
Status: DISCONTINUED | OUTPATIENT
Start: 2020-02-19 | End: 2020-02-20 | Stop reason: HOSPADM

## 2020-02-19 RX ORDER — ONDANSETRON 2 MG/ML
4 INJECTION INTRAMUSCULAR; INTRAVENOUS EVERY 30 MIN PRN
Status: DISCONTINUED | OUTPATIENT
Start: 2020-02-19 | End: 2020-02-19 | Stop reason: HOSPADM

## 2020-02-19 RX ORDER — MEMANTINE HYDROCHLORIDE 28 MG/1
28 CAPSULE, EXTENDED RELEASE ORAL DAILY
COMMUNITY

## 2020-02-19 RX ORDER — AMLODIPINE BESYLATE 5 MG/1
10 TABLET ORAL EVERY MORNING
Status: DISCONTINUED | OUTPATIENT
Start: 2020-02-20 | End: 2020-02-20 | Stop reason: HOSPADM

## 2020-02-19 RX ADMIN — GLYCOPYRROLATE 0.2 MG: 0.2 INJECTION, SOLUTION INTRAMUSCULAR; INTRAVENOUS at 08:31

## 2020-02-19 RX ADMIN — ACETAMINOPHEN 650 MG: 325 TABLET, FILM COATED ORAL at 18:20

## 2020-02-19 RX ADMIN — HYDROMORPHONE HYDROCHLORIDE 0.2 MG: 1 INJECTION, SOLUTION INTRAMUSCULAR; INTRAVENOUS; SUBCUTANEOUS at 18:20

## 2020-02-19 RX ADMIN — PHENYLEPHRINE HYDROCHLORIDE 100 MCG: 10 INJECTION INTRAVENOUS at 09:51

## 2020-02-19 RX ADMIN — ONDANSETRON 4 MG: 2 INJECTION INTRAMUSCULAR; INTRAVENOUS at 10:13

## 2020-02-19 RX ADMIN — ROCURONIUM BROMIDE 50 MG: 10 INJECTION INTRAVENOUS at 07:38

## 2020-02-19 RX ADMIN — PROPOFOL 200 MG: 10 INJECTION, EMULSION INTRAVENOUS at 07:38

## 2020-02-19 RX ADMIN — HYDROMORPHONE HYDROCHLORIDE 0.2 MG: 1 INJECTION, SOLUTION INTRAMUSCULAR; INTRAVENOUS; SUBCUTANEOUS at 21:37

## 2020-02-19 RX ADMIN — SUGAMMADEX 130 MG: 100 INJECTION, SOLUTION INTRAVENOUS at 10:47

## 2020-02-19 RX ADMIN — PHENYLEPHRINE HYDROCHLORIDE 100 MCG: 10 INJECTION INTRAVENOUS at 09:32

## 2020-02-19 RX ADMIN — SENNOSIDES AND DOCUSATE SODIUM 2 TABLET: 8.6; 5 TABLET ORAL at 21:37

## 2020-02-19 RX ADMIN — ROCURONIUM BROMIDE 10 MG: 10 INJECTION INTRAVENOUS at 09:01

## 2020-02-19 RX ADMIN — ROCURONIUM BROMIDE 20 MG: 10 INJECTION INTRAVENOUS at 09:24

## 2020-02-19 RX ADMIN — CEFAZOLIN SODIUM 2 G: 2 INJECTION, SOLUTION INTRAVENOUS at 07:49

## 2020-02-19 RX ADMIN — SODIUM CHLORIDE, POTASSIUM CHLORIDE, SODIUM LACTATE AND CALCIUM CHLORIDE: 600; 310; 30; 20 INJECTION, SOLUTION INTRAVENOUS at 06:58

## 2020-02-19 RX ADMIN — HYDROMORPHONE HYDROCHLORIDE 0.25 MG: 1 INJECTION, SOLUTION INTRAMUSCULAR; INTRAVENOUS; SUBCUTANEOUS at 09:58

## 2020-02-19 RX ADMIN — PHENYLEPHRINE HYDROCHLORIDE 100 MCG: 10 INJECTION INTRAVENOUS at 08:34

## 2020-02-19 RX ADMIN — LIDOCAINE HYDROCHLORIDE 0.2 ML: 10 INJECTION, SOLUTION EPIDURAL; INFILTRATION; INTRACAUDAL; PERINEURAL at 06:58

## 2020-02-19 RX ADMIN — PHENYLEPHRINE HYDROCHLORIDE 0.5 MCG/KG/MIN: 10 INJECTION INTRAVENOUS at 08:36

## 2020-02-19 RX ADMIN — PROPOFOL 25 MCG/KG/MIN: 10 INJECTION, EMULSION INTRAVENOUS at 08:05

## 2020-02-19 RX ADMIN — ACETAMINOPHEN 650 MG: 325 TABLET, FILM COATED ORAL at 21:37

## 2020-02-19 RX ADMIN — HYDROMORPHONE HYDROCHLORIDE 0.5 MG: 1 INJECTION, SOLUTION INTRAMUSCULAR; INTRAVENOUS; SUBCUTANEOUS at 08:18

## 2020-02-19 RX ADMIN — DEXAMETHASONE SODIUM PHOSPHATE 4 MG: 4 INJECTION, SOLUTION INTRA-ARTICULAR; INTRALESIONAL; INTRAMUSCULAR; INTRAVENOUS; SOFT TISSUE at 08:09

## 2020-02-19 RX ADMIN — PHENYLEPHRINE HYDROCHLORIDE 100 MCG: 10 INJECTION INTRAVENOUS at 08:13

## 2020-02-19 RX ADMIN — LIDOCAINE HYDROCHLORIDE 80 MG: 20 INJECTION, SOLUTION INFILTRATION; PERINEURAL at 07:38

## 2020-02-19 RX ADMIN — PHENYLEPHRINE HYDROCHLORIDE 100 MCG: 10 INJECTION INTRAVENOUS at 09:48

## 2020-02-19 RX ADMIN — ROCURONIUM BROMIDE 20 MG: 10 INJECTION INTRAVENOUS at 08:26

## 2020-02-19 RX ADMIN — CEFAZOLIN 1 G: 1 INJECTION, POWDER, FOR SOLUTION INTRAMUSCULAR; INTRAVENOUS at 09:46

## 2020-02-19 RX ADMIN — ACETAMINOPHEN 650 MG: 325 TABLET, FILM COATED ORAL at 15:14

## 2020-02-19 RX ADMIN — FENTANYL CITRATE 100 MCG: 50 INJECTION, SOLUTION INTRAMUSCULAR; INTRAVENOUS at 07:38

## 2020-02-19 RX ADMIN — OXYCODONE HYDROCHLORIDE 5 MG: 5 TABLET ORAL at 15:14

## 2020-02-19 RX ADMIN — MEMANTINE HYDROCHLORIDE 28 MG: 28 CAPSULE, EXTENDED RELEASE ORAL at 15:14

## 2020-02-19 RX ADMIN — HYDROMORPHONE HYDROCHLORIDE 0.25 MG: 1 INJECTION, SOLUTION INTRAMUSCULAR; INTRAVENOUS; SUBCUTANEOUS at 10:03

## 2020-02-19 RX ADMIN — PHENYLEPHRINE HYDROCHLORIDE 100 MCG: 10 INJECTION INTRAVENOUS at 08:01

## 2020-02-19 ASSESSMENT — ACTIVITIES OF DAILY LIVING (ADL)
ADLS_ACUITY_SCORE: 23
ADLS_ACUITY_SCORE: 23

## 2020-02-19 ASSESSMENT — MIFFLIN-ST. JEOR: SCORE: 1156.79

## 2020-02-19 NOTE — ANESTHESIA POSTPROCEDURE EVALUATION
Patient: Zenobia Garcia    Procedure(s):  NEPHRECTOMY, ROBOT-ASSISTED, LAPAROSCOPIC  Laparoscopic lysis adhesions    Diagnosis:Right renal mass [N28.89]  Diagnosis Additional Information: No value filed.    Anesthesia Type:  General    Note:  Anesthesia Post Evaluation    Patient location during evaluation: PACU  Patient participation: Able to fully participate in evaluation  Level of consciousness: sleepy but conscious and responsive to verbal stimuli  Pain management: adequate  Airway patency: patent  Cardiovascular status: acceptable and hemodynamically stable  Respiratory status: acceptable and unassisted  Hydration status: acceptable  PONV: none     Anesthetic complications: None          Last vitals:  Vitals:    02/19/20 1300 02/19/20 1321 02/19/20 1358   BP: 133/76 (!) 141/76 (!) 145/75   Pulse: 77     Resp: 10 14 8   Temp: 36.5  C (97.7  F) 36.2  C (97.1  F) 36.4  C (97.6  F)   SpO2: 100% 98% 98%         Electronically Signed By: Delvin Ayala MD  February 19, 2020  2:52 PM

## 2020-02-19 NOTE — PROGRESS NOTES
Medication History Completed by Medication Scribe  Admission medication history interview status for the 2/19/2020  admission is complete. See EPIC admission navigator for prior to admission medications     Medication history sources: Patient, Patient's family/friend (daughter), Surescripts and H&P  Medication history source reliability: Moderate  Adherence assessment: Moderate    Significant changes made to the medication list:  Patient taking meds differently than prescribed; See PTA entries for: memantine      Additional medication history information:   None    Medication reconciliation completed by provider prior to medication history? No    Time spent in this activity: 30 mintues      Prior to Admission medications    Medication Sig Last Dose Taking? Auth Provider   acetaminophen (TYLENOL) 500 MG tablet Take 1,000 mg by mouth every 6 hours as needed  2/16/2020 at prn Yes Reported, Patient   amLODIPine (NORVASC) 10 MG tablet Take 10 mg by mouth every morning  2/19/2020 at 0400 Yes Reported, Patient   aspirin-dipyridamole ER (AGGRENOX)  MG 12 hr capsule Take 1 capsule by mouth daily as needed  2/13/2020 at prn Yes Reported, Patient   atorvastatin (LIPITOR) 80 MG tablet Take 80 mg by mouth every morning  2/13/2020 at am Yes Reported, Patient   benzonatate (TESSALON) 100 MG capsule Take 100 mg by mouth as needed  Over 1 month ago at prn Yes Reported, Patient   calcipotriene (DOVONOX) 0.005 % external solution Apply 1 applicator topically daily as needed  2/18/2020 at am Yes Reported, Patient   Calcium Carb-Cholecalciferol (OYSTER SHELL CALCIUM/VITAMIN D) 500-200 MG-UNIT TABS Take 1 tablet by mouth every morning  2/13/2020 at am Yes Reported, Patient   Coal Tar Extract 5 % LOTN Apply 5 mLs topically daily as needed  2/13/2020 at prn Yes Reported, Patient   diphenhydrAMINE (BANOPHEN) 25 MG capsule Take 25 mg by mouth daily as needed  2/13/2020 at prn Yes Reported, Patient   diphenhydrAMINE-zinc acetate  (BENADRYL) 2-0.1 % external cream Apply topically daily as needed  2/13/2020 at prn Yes Reported, Patient   fluocinolone acetonide (DERMA SMOOTHE/FS BODY) 0.01 % external oil as needed  2/18/2020 at am Yes Reported, Patient   hydrochlorothiazide (HYDRODIURIL) 25 MG tablet Take 25 mg by mouth every morning  2/13/2020 at am Yes Reported, Patient   hydrOXYzine (ATARAX) 10 MG tablet Take 10 mg by mouth every morning  2/13/2020 at am Yes Reported, Patient   ketoconazole (NIZORAL) 2 % external cream Apply topically daily as needed  2/13/2020 at prn Yes Reported, Patient   memantine XR 28 MG PO 24 hr capsule Take 28 mg by mouth daily 2/13/2020 at am Yes Reported, Patient   potassium chloride ER (K-TAB/KLOR-CON) 10 MEQ CR tablet Take 10 mEq by mouth every morning  2/13/2020 at am Yes Reported, Patient   Salicylic Acid 3 % CREA Apply 1 g topically daily as needed  2/13/2020 at prn Yes Reported, Patient   senna-docusate (SENOKOT-S/PERICOLACE) 8.6-50 MG tablet Take 2 tablets by mouth daily as needed for constipation  Over 3 weeks ago at prn Yes Reported, Patient   triamcinolone (KENALOG) 0.1 % external cream Apply topically daily as needed  Over 1 month ago at prn Yes Reported, Patient   vitamin C (ASCORBIC ACID) 500 MG tablet Take 500 mg by mouth every morning  2/13/2020 at am Yes Reported, Patient   vitamin D2 58491 units (1250 mcg) PO capsule Take 50,000 Units by mouth once a week 2/13/2020 at am Yes Reported, Patient   Blood Pressure Monitoring (BLOOD PRESSURE KIT) KIT    Reported, Patient

## 2020-02-19 NOTE — OP NOTE
DATE OF SURGERY: 2/19/2020  PREOPERATIVE DIAGNOSIS:  Right renal mass.   POSTOPERATIVE DIAGNOSIS: Right renal mass.   PROCEDURE PERFORMED:   1) Right robotic-assisted laparoscopic radical nephrectomy  2) Lysis of adhesions - >30 minutes     STAFF SURGEON: Carlos Morales MD, present and scrubbed for all critical portions of the procedure  RESIDENT SURGEON: Feliciano Fuller MD  ANESTHESIA: General.   ESTIMATED BLOOD LOSS: 150 mL.   DRAINS AND TUBES: Soliz catheter  COMPLICATIONS: None.   DISPOSITION: PACU.   SPECIMENS OBTAINED:   ID Type Source Tests Collected by Time Destination   A :  Tissue Kidney, Right SURGICAL PATHOLOGY EXAM Carlos Morales MD 2/19/2020 10:21 AM        SIGNIFICANT FINDINGS: Right kidney was resected with visually negative margins. Extensive omental adhesions noted in the right upper quadrant. Greater than 30 minutes of adhesiolysis completed at beginning of procedure.     HISTORY OF PRESENT ILLNESS: Zenobia Garcia is a 75 year old female with a history of a 9 cm right renal mass. After a discussion of all risks, benefits, and alternatives, the patient elected to undergo definitive management with a radical nephrectomy.    OPERATION PERFORMED:   Informed consent was obtained and the patient was brought to the operating room where general anesthesia was induced. She was given appropriate preoperative antibiotics and positioned in the full flank position where all pressure points were carefully padded and secured. She was then prepped and draped in the usual sterile fashion. We then performed a timeout, verifying the correct patient's site and procedure to be performed.      Incision was made superior and lateral to the umbilicus a the lateral edge of the rectus. After confirmatory drop test, the Veress needle was used for insufflation. We placed an 8 mm camera port. Despite appropriate opening pressure with the Veress needle, it was apparent the port was not in an open peritoneal  space. Fat was noted and camera appeared to be in an omental adhesion. A second 8-mm port was placed in a separate location, where again, omental adhesions were noted. This port, however, was navigated to an open space within the peritoneum, anterior to the liver. In this location, a right upper quadrant port was placed under direct vision. At that point, the right upper quadrant was evaluated and a sheet of dense omental adhesions was noted, essentially covering the entirety of the anterior abdominal wall in the right upper quadrant. Lap scissors was then used to sharply lyse these adhesions. Adhesiolysis was completed for greater than 30 minutes. Eventually, enough space was created to place the 12 mm assistant port, and all three 8 mm ports were in appropriate locations. No bowel injuries were noted.    The robot was docked. The colon was reflected medially to expose the anterior surface of Gerota's fascia. The medial aspect of the kidney was exposed and the ureter and gonadal vein were identified.The kidney was lifted off the psoas muscle and dissection was carried posteriorly until the renal artery and vein were identified. The renal artery was carefully skeletonized. There was a single renal artery and vein.  The renal artery and vein were sequentially ligated and divided with the stapler. The lateral and upper pole attachments were then taken. The gonadal vein and ureter were ligated with clips and divided. The adrenal gland was spared. Good hemostasis was noted.    Specimen was placed in a 15 mm EndoCatch bag. The robot was undocked and the camera was placed in the right lower quadrant and right upper quadrant ports again, to evaluate for any evidence of bleeding or bowel injury given the extensive adhesions noted. Again, now bowel injury was visible and there was good hemostasis. Ports were removed under direct vision. A modified Tucker incision was made to allow for tumor extraction. The specimen was  removed and sent to pathology. We closed the fascia from the extraction site with 0-Vicryl in a running fashion. Skin was re approximated using 4-0 Monocryl. The wounds were dressed with skin glue. The patient was then awakened and taken to the PACU in stable condition.     Carlos Morales MD  Urology  [facility]  Clinic Phone [phone]

## 2020-02-19 NOTE — BRIEF OP NOTE
[facility]    Brief Operative Note    Pre-operative diagnosis: Right renal mass [N28.89]  Post-operative diagnosis Same as pre-operative diagnosis    Procedure: Procedure(s):  NEPHRECTOMY, ROBOT-ASSISTED, LAPAROSCOPIC, POSSIBLE OPEN  Laparoscopic lysis adhesions  Surgeon: Surgeon(s) and Role:     * Carlos Morales MD - Primary  Anesthesia: General   Estimated blood loss: Less than 100 ml  Drains: Soliz catheter  Specimens:   ID Type Source Tests Collected by Time Destination   A :  Tissue Kidney, Right SURGICAL PATHOLOGY EXAM Carlos Morales MD 2/19/2020 10:21 AM      Findings:   Extensive adhesions - otherwise unremarkable case  Complications: None.  Implants: * No implants in log *

## 2020-02-20 VITALS
HEART RATE: 79 BPM | DIASTOLIC BLOOD PRESSURE: 63 MMHG | OXYGEN SATURATION: 99 % | HEIGHT: 64 IN | BODY MASS INDEX: 25.29 KG/M2 | SYSTOLIC BLOOD PRESSURE: 122 MMHG | TEMPERATURE: 98 F | RESPIRATION RATE: 18 BRPM | WEIGHT: 148.1 LBS

## 2020-02-20 LAB
ANION GAP SERPL CALCULATED.3IONS-SCNC: 6 MMOL/L (ref 3–14)
BUN SERPL-MCNC: 18 MG/DL (ref 7–30)
CALCIUM SERPL-MCNC: 8 MG/DL (ref 8.5–10.1)
CHLORIDE SERPL-SCNC: 106 MMOL/L (ref 94–109)
CO2 SERPL-SCNC: 26 MMOL/L (ref 20–32)
CREAT SERPL-MCNC: 1.39 MG/DL (ref 0.52–1.04)
ERYTHROCYTE [DISTWIDTH] IN BLOOD BY AUTOMATED COUNT: 15.9 % (ref 10–15)
GFR SERPL CREATININE-BSD FRML MDRD: 37 ML/MIN/{1.73_M2}
GLUCOSE BLDC GLUCOMTR-MCNC: 104 MG/DL (ref 70–99)
GLUCOSE SERPL-MCNC: 106 MG/DL (ref 70–99)
HCT VFR BLD AUTO: 31.9 % (ref 35–47)
HGB BLD-MCNC: 10.3 G/DL (ref 11.7–15.7)
MCH RBC QN AUTO: 26.5 PG (ref 26.5–33)
MCHC RBC AUTO-ENTMCNC: 32.3 G/DL (ref 31.5–36.5)
MCV RBC AUTO: 82 FL (ref 78–100)
PLATELET # BLD AUTO: 265 10E9/L (ref 150–450)
POTASSIUM SERPL-SCNC: 3.5 MMOL/L (ref 3.4–5.3)
RBC # BLD AUTO: 3.89 10E12/L (ref 3.8–5.2)
SODIUM SERPL-SCNC: 138 MMOL/L (ref 133–144)
WBC # BLD AUTO: 8.2 10E9/L (ref 4–11)

## 2020-02-20 PROCEDURE — 25000132 ZZH RX MED GY IP 250 OP 250 PS 637: Performed by: STUDENT IN AN ORGANIZED HEALTH CARE EDUCATION/TRAINING PROGRAM

## 2020-02-20 PROCEDURE — 36415 COLL VENOUS BLD VENIPUNCTURE: CPT | Performed by: STUDENT IN AN ORGANIZED HEALTH CARE EDUCATION/TRAINING PROGRAM

## 2020-02-20 PROCEDURE — 25000128 H RX IP 250 OP 636: Performed by: UROLOGY

## 2020-02-20 PROCEDURE — 25800030 ZZH RX IP 258 OP 636: Performed by: STUDENT IN AN ORGANIZED HEALTH CARE EDUCATION/TRAINING PROGRAM

## 2020-02-20 PROCEDURE — 00000146 ZZHCL STATISTIC GLUCOSE BY METER IP

## 2020-02-20 PROCEDURE — 80048 BASIC METABOLIC PNL TOTAL CA: CPT | Performed by: STUDENT IN AN ORGANIZED HEALTH CARE EDUCATION/TRAINING PROGRAM

## 2020-02-20 PROCEDURE — 85027 COMPLETE CBC AUTOMATED: CPT | Performed by: STUDENT IN AN ORGANIZED HEALTH CARE EDUCATION/TRAINING PROGRAM

## 2020-02-20 RX ORDER — METHYLERGONOVINE MALEATE 0.2 MG/ML
INJECTION INTRAVENOUS
Status: DISCONTINUED
Start: 2020-02-20 | End: 2020-02-20 | Stop reason: HOSPADM

## 2020-02-20 RX ORDER — ACETAMINOPHEN 325 MG/1
650 TABLET ORAL EVERY 4 HOURS PRN
Qty: 50 TABLET | Refills: 0 | Status: SHIPPED | OUTPATIENT
Start: 2020-02-20

## 2020-02-20 RX ORDER — OXYCODONE HYDROCHLORIDE 5 MG/1
5 TABLET ORAL EVERY 6 HOURS PRN
Qty: 12 TABLET | Refills: 0 | Status: SHIPPED | OUTPATIENT
Start: 2020-02-20 | End: 2020-02-23

## 2020-02-20 RX ADMIN — HYDROXYZINE HYDROCHLORIDE 10 MG: 10 TABLET, FILM COATED ORAL at 08:12

## 2020-02-20 RX ADMIN — AMLODIPINE BESYLATE 10 MG: 5 TABLET ORAL at 08:13

## 2020-02-20 RX ADMIN — HYDROCHLOROTHIAZIDE 25 MG: 25 TABLET ORAL at 08:12

## 2020-02-20 RX ADMIN — ACETAMINOPHEN 650 MG: 325 TABLET, FILM COATED ORAL at 13:39

## 2020-02-20 RX ADMIN — OXYCODONE HYDROCHLORIDE 10 MG: 5 TABLET ORAL at 14:49

## 2020-02-20 RX ADMIN — HYDROMORPHONE HYDROCHLORIDE 0.2 MG: 1 INJECTION, SOLUTION INTRAMUSCULAR; INTRAVENOUS; SUBCUTANEOUS at 01:07

## 2020-02-20 RX ADMIN — ATORVASTATIN CALCIUM 80 MG: 40 TABLET, FILM COATED ORAL at 08:12

## 2020-02-20 RX ADMIN — OXYCODONE HYDROCHLORIDE 5 MG: 5 TABLET ORAL at 08:12

## 2020-02-20 RX ADMIN — SENNOSIDES AND DOCUSATE SODIUM 2 TABLET: 8.6; 5 TABLET ORAL at 08:12

## 2020-02-20 RX ADMIN — SODIUM CHLORIDE: 9 INJECTION, SOLUTION INTRAVENOUS at 11:10

## 2020-02-20 RX ADMIN — MEMANTINE HYDROCHLORIDE 28 MG: 28 CAPSULE, EXTENDED RELEASE ORAL at 08:12

## 2020-02-20 RX ADMIN — ACETAMINOPHEN 650 MG: 325 TABLET, FILM COATED ORAL at 07:02

## 2020-02-20 RX ADMIN — POTASSIUM CHLORIDE 10 MEQ: 10 TABLET, EXTENDED RELEASE ORAL at 08:12

## 2020-02-20 RX ADMIN — ACETAMINOPHEN 650 MG: 325 TABLET, FILM COATED ORAL at 10:58

## 2020-02-20 RX ADMIN — ACETAMINOPHEN 650 MG: 325 TABLET, FILM COATED ORAL at 01:07

## 2020-02-20 RX ADMIN — OXYCODONE HYDROCHLORIDE 5 MG: 5 TABLET ORAL at 07:05

## 2020-02-20 ASSESSMENT — MIFFLIN-ST. JEOR: SCORE: 1161.78

## 2020-02-20 ASSESSMENT — ACTIVITIES OF DAILY LIVING (ADL)
ADLS_ACUITY_SCORE: 22
ADLS_ACUITY_SCORE: 23

## 2020-02-20 NOTE — PLAN OF CARE
VSS on RA. C/o pain in right side, dilaudid given x2 along with scheduled tylenol. BS hypoactive. LS diminished in bases. Lap sites x3 and right abdominal incision intact with derma bond. Refused capno overnight, o2 stable on RA. Soliz in place and patent with good UO.

## 2020-02-20 NOTE — PLAN OF CARE
A/ o x4, speech slurred d/t previous CVA.  C/o pain to R lower abdomen, prn oxycodone, prn dilaudid IV and scheduled tylenol given.  Capno WDL.  Weaned to RA.  Tolerating clear liquid diet.  Soliz patent clear yellow urine.  Due to get out of bed.  Has x 3 port sites and R incision dermabonded without any drainage.  Discharge pending clinical improvement.

## 2020-02-20 NOTE — PROGRESS NOTES
Patient discharged at 4:57 PM to home with sister.  IV was discontinued. Pain at time of discharge was 3, prn oxycodone given at 1500.  Belongings returned to patient.  Discharge instructions and medications reviewed with patient and sister Lissett.  Patient verbalized understanding and all questions were answered.  Prescriptions given to patient.  At time of discharge, patient condition was stable and left the unit escorted by nursing assistant.

## 2020-02-21 NOTE — DISCHARGE SUMMARY
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 acetaminophen 325 MG PO tablet Take 2 tablets (650 mg) by mouth every 4 hours as needed for mild pain, Disp-50 tablet, R-0, E-Prescribe      oxyCODONE 5 MG PO tablet Take 1 tablet (5 mg) by mouth every 6 hours as needed for pain, Disp-12 tablet, R-0, Local Print       !! - Potential duplicate medications found. Please discuss with provider.      CONTINUE these medications which have NOT CHANGED    Details   !! acetaminophen (TYLENOL) 500 MG tablet Take 1,000 mg by mouth every 6 hours as needed , Historical      amLODIPine (NORVASC) 10 MG tablet Take 10 mg by mouth every morning , Historical      aspirin-dipyridamole ER (AGGRENOX)  MG 12 hr capsule Take 1 capsule by mouth daily as needed , Historical      atorvastatin (LIPITOR) 80 MG tablet Take 80 mg by mouth every morning , Historical      benzonatate (TESSALON) 100 MG capsule Take 100 mg by mouth as needed , Historical      Blood Pressure Monitoring (BLOOD PRESSURE KIT) KIT Historical       calcipotriene (DOVONOX) 0.005 % external solution Apply 1 applicator topically daily as needed , Historical      Calcium Carb-Cholecalciferol (OYSTER SHELL CALCIUM/VITAMIN D) 500-200 MG-UNIT TABS Take 1 tablet by mouth every morning , Historical      Coal Tar Extract 5 % LOTN Apply 5 mLs topically daily as needed Historical      diphenhydrAMINE (BANOPHEN) 25 MG capsule Take 25 mg by mouth daily as needed , Historical      diphenhydrAMINE-zinc acetate (BENADRYL) 2-0.1 % external cream Apply topically daily as needed Historical      fluocinolone acetonide (DERMA SMOOTHE/FS BODY) 0.01 % external oil as needed Historical      hydrochlorothiazide (HYDRODIURIL) 25 MG tablet Take 25 mg by mouth every morning , Historical      hydrOXYzine (ATARAX) 10 MG tablet Take 10 mg by mouth every morning , Historical      ketoconazole (NIZORAL) 2 % external cream Apply topically daily as needed Historical      memantine XR 28 MG PO 24 hr capsule Take 28 mg by mouth daily, Historical      potassium chloride ER (K-TAB/KLOR-CON) 10 MEQ CR tablet Take 10 mEq by mouth every morning , Historical      Salicylic Acid 3 % CREA Apply 1 g topically daily as needed Historical      senna-docusate (SENOKOT-S/PERICOLACE) 8.6-50 MG tablet Take 2 tablets by mouth daily as needed for constipation , Historical      triamcinolone (KENALOG) 0.1 % external cream Apply topically daily as needed Historical      vitamin C (ASCORBIC ACID) 500 MG tablet Take 500 mg by mouth every morning , Historical      vitamin D2 22180 units (1250 mcg) PO capsule Take 50,000 Units by mouth once a week, Historical       !! - Potential duplicate medications found. Please discuss with provider.              Consultations:   No consultations were requested during this admission          Brief History of Illness:   75 year old female with right renal mass.           Hospital Course:   The patient was admitted to the hospital and underwent the above named procedures.  For  specific details, please refer to the dictated operative report in the patient's chart.  In summary, the patient tolerated the procedure well and there were no intraoperative complications.  Following the procedure the patient was admitted to the floor for routine post operative care.      The patient's overall hospital course was uneventful, and they remained hemodynamically stable, and afebrile for the hospital stay.  Diet was able to be advanced as tolerated to a regular diet, which was tolerated well before discharge. Patient also had return of antegrade bowel function prior to discharging from the hospital.  Labs also remained stable.  At this time the patient was determined stable for discharge from the hospital.            Discharge Instructions and Follow-Up:   Discharge diet: Regular   Discharge activity: No lifting or strenuous exercise for 6 week(s)   Discharge follow-up: Follow up with me in 2 weeks   Wound care: May get incision wet in shower but do not soak or scrub           Discharge Disposition:   Discharged to home      Attestation: I have reviewed today's vital signs, notes, medications, labs and imaging.    Carlos Morales MD  February 21, 2020

## 2020-02-24 ENCOUNTER — TELEPHONE (OUTPATIENT)
Dept: UROLOGY | Facility: CLINIC | Age: 75
End: 2020-02-24

## 2020-02-24 NOTE — TELEPHONE ENCOUNTER
Patient's sister Lissett called and stated that it was urgent she speak with MD. Zenobia's family came and picked her up at 4:30 this morning and brought her back to [facility] I believe. She is aware patient is not supposed to travel for six weeks. She knows that MD told her specifically not to, and family also didn't take patient's medications with. Message sent to MD.     Laurel Alexander LPN

## 2020-03-03 LAB — COPATH REPORT: NORMAL

## 2020-03-17 ENCOUNTER — TELEPHONE (OUTPATIENT)
Dept: UROLOGY | Facility: CLINIC | Age: 75
End: 2020-03-17

## 2020-03-17 NOTE — TELEPHONE ENCOUNTER
Left message to have patient's sister call me to see how she is doing  Kalpana Conte, RN   Care Coordinator Urology

## 2020-03-17 NOTE — TELEPHONE ENCOUNTER
----- Message from Carlos Morales MD sent at 3/11/2020  1:23 PM CDT -----    Kalpana,    From what I can gather, sounds like Zenobia was taken by her children back to [place] after her surgery. She missed her follow-up. I have called her contact number which is her sister and left messages, with no reply.    Can you try the sister again? If Zenobia is permanently back in [place], it would be good if we can get her records to her local doctors. She should see someone locally and have a follow-up scan in 3-6 months. Path is below.    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  Mayito

## 2023-11-27 NOTE — ANESTHESIA CARE TRANSFER NOTE
Patient: Zenobia Garcia    Procedure(s):  NEPHRECTOMY, ROBOT-ASSISTED, LAPAROSCOPIC, POSSIBLE OPEN  Laparoscopic lysis adhesions    Diagnosis: Right renal mass [N28.89]  Diagnosis Additional Information: No value filed.    Anesthesia Type:   General     Note:  Airway :Face Mask  Patient transferred to:PACU  Handoff Report: Identifed the Patient, Identified the Reponsible Provider, Reviewed the pertinent medical history, Discussed the surgical course, Reviewed Intra-OP anesthesia mangement and issues during anesthesia, Set expectations for post-procedure period and Allowed opportunity for questions and acknowledgement of understanding      Vitals: (Last set prior to Anesthesia Care Transfer)    CRNA VITALS  2/19/2020 1029 - 2/19/2020 1105      2/19/2020             NIBP:  168/86    NIBP Mean:  142                Electronically Signed By: FRANCISCO JAVIER Griffin CRNA  February 19, 2020  11:05 AM   Has smoked a pack a day for 35 years. Last use was about a month ago.

## (undated) DEVICE — TUBING CONMED AIRSEAL SMOKE EVAC INSUFFLATION ASM-EVAC

## (undated) DEVICE — TAPE DURAPORE 3" SILK 1538-3

## (undated) DEVICE — SURGICEL HEMOSTAT 2X14" 1951

## (undated) DEVICE — SU MONOCRYL 4-0 PS-2 18" UND Y496G

## (undated) DEVICE — SOL WATER IRRIG 1000ML BOTTLE 2F7114

## (undated) DEVICE — Device

## (undated) DEVICE — NDL INSUFFLATION 13GA 120MM C2201

## (undated) DEVICE — DAVINCI HOT SHEARS TIP COVER  400180

## (undated) DEVICE — STPL ENDO ARTICULATING 45MM EC45A

## (undated) DEVICE — LINEN TOWEL PACK X5 5464

## (undated) DEVICE — SU SILK 2-0 FS-1 18" 685G

## (undated) DEVICE — GLOVE PROTEXIS W/NEU-THERA 7.5  2D73TE75

## (undated) DEVICE — TROCAR AIRSEAL BLADELESS 12X120MM IAS12-120

## (undated) DEVICE — CATH TRAY FOLEY SURESTEP 16FR WDRAIN BAG STLK LATEX A300316A

## (undated) DEVICE — DAVINCI XI DRAPE ARM 470015

## (undated) DEVICE — PACK DAVINCI UROLOGY SBA15UDFSG

## (undated) DEVICE — SU VICRYL 0 CT-1 27" J340H

## (undated) DEVICE — CLIP ENDO HEMO-LOC PURPLE LG 544240

## (undated) DEVICE — DAVINCI XI MONOPOLAR SCISSORS HOT SHEARS 8MM 470179

## (undated) DEVICE — DAVINCI XI GRASPER ENDOWRIST PROGRASP 470093

## (undated) DEVICE — BLADE KNIFE SURG 10 371110

## (undated) DEVICE — SOL NACL 0.9% INJ 1000ML BAG 2B1324X

## (undated) DEVICE — ENDO POUCH UNIVERSAL RETRIEVAL SYSTEM INZII 12/15MM CD004

## (undated) DEVICE — GLOVE PROTEXIS BLUE W/NEU-THERA 6.5  2D73EB65

## (undated) DEVICE — DAVINCI XI DRAPE COLUMN 470341

## (undated) DEVICE — ESU ENDO SCISSORS 5MM CVD 5DCS

## (undated) DEVICE — ESU GROUND PAD ADULT W/CORD E7507

## (undated) DEVICE — SOL NACL 0.9% IRRIG 1000ML BOTTLE 2F7124

## (undated) DEVICE — SU VICRYL 0 UR-6 27" J603H

## (undated) DEVICE — PROTECTOR ARM ONE-STEP TRENDELENBURG 40418

## (undated) DEVICE — DAVINCI XI SEAL UNIVERSAL 5-8MM 470361

## (undated) DEVICE — SUCTION IRR STRYKERFLOW II W/TIP 250-070-520

## (undated) RX ORDER — PROPOFOL 10 MG/ML
INJECTION, EMULSION INTRAVENOUS
Status: DISPENSED
Start: 2020-02-19

## (undated) RX ORDER — CEFAZOLIN SODIUM 2 G/100ML
INJECTION, SOLUTION INTRAVENOUS
Status: DISPENSED
Start: 2020-02-19

## (undated) RX ORDER — BUPIVACAINE HYDROCHLORIDE 2.5 MG/ML
INJECTION, SOLUTION EPIDURAL; INFILTRATION; INTRACAUDAL
Status: DISPENSED
Start: 2020-02-19

## (undated) RX ORDER — ONDANSETRON 2 MG/ML
INJECTION INTRAMUSCULAR; INTRAVENOUS
Status: DISPENSED
Start: 2020-02-19

## (undated) RX ORDER — HYDROMORPHONE HYDROCHLORIDE 1 MG/ML
INJECTION, SOLUTION INTRAMUSCULAR; INTRAVENOUS; SUBCUTANEOUS
Status: DISPENSED
Start: 2020-02-19

## (undated) RX ORDER — FENTANYL CITRATE 50 UG/ML
INJECTION, SOLUTION INTRAMUSCULAR; INTRAVENOUS
Status: DISPENSED
Start: 2020-02-19

## (undated) RX ORDER — LIDOCAINE HYDROCHLORIDE 20 MG/ML
INJECTION, SOLUTION EPIDURAL; INFILTRATION; INTRACAUDAL; PERINEURAL
Status: DISPENSED
Start: 2020-02-19

## (undated) RX ORDER — DEXAMETHASONE SODIUM PHOSPHATE 4 MG/ML
INJECTION, SOLUTION INTRA-ARTICULAR; INTRALESIONAL; INTRAMUSCULAR; INTRAVENOUS; SOFT TISSUE
Status: DISPENSED
Start: 2020-02-19

## (undated) RX ORDER — GLYCOPYRROLATE 0.2 MG/ML
INJECTION, SOLUTION INTRAMUSCULAR; INTRAVENOUS
Status: DISPENSED
Start: 2020-02-19

## (undated) RX ORDER — METOPROLOL TARTRATE 1 MG/ML
INJECTION, SOLUTION INTRAVENOUS
Status: DISPENSED
Start: 2020-02-10

## (undated) RX ORDER — DOBUTAMINE HYDROCHLORIDE 200 MG/100ML
INJECTION INTRAVENOUS
Status: DISPENSED
Start: 2020-02-10

## (undated) RX ORDER — CEFAZOLIN SODIUM 1 G/3ML
INJECTION, POWDER, FOR SOLUTION INTRAMUSCULAR; INTRAVENOUS
Status: DISPENSED
Start: 2020-02-19